# Patient Record
Sex: FEMALE | Race: WHITE | NOT HISPANIC OR LATINO | Employment: FULL TIME | URBAN - METROPOLITAN AREA
[De-identification: names, ages, dates, MRNs, and addresses within clinical notes are randomized per-mention and may not be internally consistent; named-entity substitution may affect disease eponyms.]

---

## 2017-02-28 ENCOUNTER — OFFICE VISIT (OUTPATIENT)
Dept: URGENT CARE | Facility: PHYSICIAN GROUP | Age: 22
End: 2017-02-28
Payer: COMMERCIAL

## 2017-02-28 VITALS
TEMPERATURE: 98.4 F | HEART RATE: 95 BPM | DIASTOLIC BLOOD PRESSURE: 56 MMHG | WEIGHT: 110 LBS | HEIGHT: 60 IN | RESPIRATION RATE: 20 BRPM | OXYGEN SATURATION: 97 % | BODY MASS INDEX: 21.6 KG/M2 | SYSTOLIC BLOOD PRESSURE: 110 MMHG

## 2017-02-28 DIAGNOSIS — J06.9 ACUTE URI: ICD-10-CM

## 2017-02-28 LAB
FLUAV+FLUBV AG SPEC QL IA: NEGATIVE
INT CON NEG: NEGATIVE
INT CON NEG: NEGATIVE
INT CON POS: POSITIVE
INT CON POS: POSITIVE
S PYO AG THROAT QL: NEGATIVE

## 2017-02-28 PROCEDURE — 87880 STREP A ASSAY W/OPTIC: CPT | Performed by: PHYSICIAN ASSISTANT

## 2017-02-28 PROCEDURE — 99214 OFFICE O/P EST MOD 30 MIN: CPT | Performed by: PHYSICIAN ASSISTANT

## 2017-02-28 PROCEDURE — 87804 INFLUENZA ASSAY W/OPTIC: CPT | Performed by: PHYSICIAN ASSISTANT

## 2017-02-28 RX ORDER — CODEINE PHOSPHATE AND GUAIFENESIN 10; 100 MG/5ML; MG/5ML
SOLUTION ORAL
Qty: 100 ML | Refills: 0 | Status: SHIPPED | OUTPATIENT
Start: 2017-02-28 | End: 2017-04-18

## 2017-02-28 RX ORDER — FLUTICASONE PROPIONATE 50 MCG
1 SPRAY, SUSPENSION (ML) NASAL 2 TIMES DAILY
Qty: 16 G | Refills: 0 | Status: SHIPPED | OUTPATIENT
Start: 2017-02-28 | End: 2017-04-18

## 2017-02-28 NOTE — MR AVS SNAPSHOT
Merissa Le Hyde   2017 6:00 PM   Office Visit   MRN: [id number]    Department:  [facility]   Dept Phone:  [phone]    Description:  Female : 1995   Provider:  Jessica Hunter PA-C           Reason for Visit     Sinus Problem Severe sinus congestion, cough, sore throat, headache x 4 days       Allergies as of 2017     No Known Allergies      You were diagnosed with     Acute URI   [[id number]]         Vital Signs     Blood Pressure Pulse Temperature Respirations Height Weight    110/56 mmHg 95 37.9 °C (100.2 °F) 20 1.524 m (5') 49.896 kg (110 lb)    Body Mass Index Oxygen Saturation Smoking Status             21.48 kg/m2 97% Current Some Day Smoker         Basic Information     Date Of Birth Sex Race Ethnicity Preferred Language    1995 Female White Non- English      Health Maintenance        Date Due Completion Dates    IMM HEP B VACCINE (1 of 3 - Primary Series) 1997 ---    IMM HEP A VACCINE (1 of 2 - Standard Series) 1998 ---    IMM HPV VACCINE (1 of 3 - Female 3 Dose Series) 2008 ---    IMM VARICELLA (CHICKENPOX) VACCINE (1 of 2 - 2 Dose Adolescent Series) 2010 ---    IMM MENINGOCOCCAL VACCINE (MCV4) (1 of 1) 2013 ---    IMM INFLUENZA (1) 2016 ---    IMM DTaP/Tdap/Td Vaccine (1 - Tdap) 2016 ---            Results     POCT Rapid Strep A                   Current Immunizations     No immunizations on file.      Below and/or attached are the medications your provider expects you to take. Review all of your home medications and newly ordered medications with your provider and/or pharmacist. Follow medication instructions as directed by your provider and/or pharmacist. Please keep your medication list with you and share with your provider. Update the information when medications are discontinued, doses are changed, or new medications (including over-the-counter products) are added; and carry medication information at all times in  the event of emergency situations     Allergies:  No Known Allergies          Medications  Valid as of: February 28, 2017 -  6:52 PM    Generic Name Brand Name Tablet Size Instructions for use    Amoxicillin (Tab) AMOXIL 875 MG Take 1 Tab by mouth 2 times a day.        Fluticasone Propionate (Suspension) FLONASE 50 MCG/ACT Spray 1 Spray in nose 2 times a day.        Guaifenesin-Codeine (Solution) ROBITUSSIN -10 mg/5mL 1-2 teaspoons at bedtime prn cough.  No driving.        Ibuprofen   Take  by mouth.        Pseudoeph-Doxylamine-DM-APAP   Take  by mouth.        .                 Medicines prescribed today were sent to:     [facility] #[id number] - [place], [place] - [address]    [address] [facility] [place] [address]    Phone: [phone] Fax: [phone]    Open 24 Hours?: No      Medication refill instructions:       If your prescription bottle indicates you have medication refills left, it is not necessary to call your provider’s office. Please contact your pharmacy and they will refill your medication.    If your prescription bottle indicates you do not have any refills left, you may request refills at any time through one of the following ways: The online [facility] system (except Urgent Care), by calling your provider’s office, or by asking your pharmacy to contact your provider’s office with a refill request. Medication refills are processed only during regular business hours and may not be available until the next business day. Your provider may request additional information or to have a follow-up visit with you prior to refilling your medication.   *Please Note: Medication refills are assigned a new Rx number when refilled electronically. Your pharmacy may indicate that no refills were authorized even though a new prescription for the same medication is available at the pharmacy. Please request the medicine by name with the pharmacy before contacting your provider for a refill.            [employer] Access Code: [email or web address]  Expires: 3/20/2017  9:59 AM    Your email address is not on file at [facility].  Email Addresses are required for you to sign up for [employer], please contact [phone] to verify your personal information and to provide your email address prior to attempting to register for [employer].    Merissa Hyde  [id number]  [place] [address]    Dezine[employer]  A secure, online tool to manage your health information     [employer]’s Dez[employer]® is a secure, online tool that connects you to your personalized health information from the privacy of your home -- day or night - making it very easy for you to manage your healthcare. Once the activation process is completed, you can even access your medical information using the Dezineforce marcela, which is available for free in the Apple Marcela store or Google Play store.     To learn more about [employer], visit [email or web address]    There are two levels of access available (as shown below):   My Chart Features  [facility] Primary Care Doctor [facility]  Specialists [facility]  Urgent  Care Non-[facility] Primary Care Doctor   Email your healthcare team securely and privately 24/7 X X X    Manage appointments: schedule your next appointment; view details of past/upcoming appointments X      Request prescription refills. X      View recent personal medical records, including lab and immunizations X X X X   View health record, including health history, allergies, medications X X X X   Read reports about your outpatient visits, procedures, consult and ER notes X X X X   See your discharge summary, which is a recap of your hospital and/or ER visit that includes your diagnosis, lab results, and care plan X X  X     How to register for [employer]:  Once your e-mail address has been verified, follow the following steps to sign up for [employer].     1. Go to  [email or web address]  2. Click on the Sign Up Now box, which takes you to the New Member  Sign Up page. You will need to provide the following information:  a. Enter your [facility] Access Code exactly as it appears at the top of this page. (You will not need to use this code after you’ve completed the sign-up process. If you do not sign up before the expiration date, you must request a new code.)   b. Enter your date of birth.   c. Enter your home email address.   d. Click Submit, and follow the next screen’s instructions.  3. Create a [facility] [employer] ID. This will be your [facility] [employer] login ID and cannot be changed, so think of one that is secure and easy to remember.  4. Create a [facility] password. You can change your password at any time.  5. Enter your Password Reset Question and Answer. This can be used at a later time if you forget your password.   6. Enter your e-mail address. This allows you to receive e-mail notifications when new information is available in [facility] [facility].  7. Click Sign Up. You can now view your health information.    For assistance activating your [facility] [employer] account, call [phone]

## 2017-02-28 NOTE — Clinical Note
February 28, 2017         Patient: Merissa Hyde   YOB: 1995   Date of Visit: 2/28/2017           To Whom it May Concern:    Merissa Hyde was seen in my clinic on 2/28/2017. She is to be off tomorrow and Thursday to recover.       If you have any questions or concerns, please don't hesitate to call.        Sincerely,           Jessica Hunter PA-C  Electronically Signed

## 2017-03-01 NOTE — PROGRESS NOTES
Chief Complaint   Patient presents with   • Sinus Problem     Severe sinus congestion, cough, sore throat, headache x 4 days        HISTORY OF PRESENT ILLNESS: Patient is a 21 y.o. female who presents today for 4 days of feeling unwell.   Overall she is about the same, possibly slightly better today.   Her headache has improved.    She states she started with headaches that felt like migraines that worsened into nasal congestion, sore throat/scratchy voice.   She has been coughing as well that has been keeping her up.  Occsaionally cigarette smoker but does use e-cigarette daily.   Has felt hot and cold, tactile fevers.   She has taken NyQuil but has not helped her sleep.  Took Advil 400 mg BID with minimal relief of headache.     There are no active problems to display for this patient.      Allergies:Review of patient's allergies indicates no known allergies.    Current Outpatient Prescriptions Ordered in [facility]   Medication Sig Dispense Refill   • Pseudoeph-Doxylamine-DM-APAP (NYQUIL PO) Take  by mouth.     • Ibuprofen (ADVIL MIGRAINE PO) Take  by mouth.     • amoxicillin (AMOXIL) 875 MG tablet Take 1 Tab by mouth 2 times a day. 20 Tab 0     No current Epic-ordered facility-administered medications on file.       No past medical history on file.    Social History   Substance Use Topics   • Smoking status: Current Some Day Smoker   • Smokeless tobacco: Not on file   • Alcohol Use: Not on file       No family status information on file.   No family history on file.No pertinent FH    ROS:  Review of Systems   Constitutional: SEE HPI  HENT: SEE HPI  Eyes: Negative for blurred vision.   Respiratory: SEE HPI  Cardiovascular: Negative for chest pain, palpitations, orthopnea and leg swelling.   Gastrointestinal: Negative for heartburn, nausea, vomiting and abdominal pain.   All other systems reviewed and are negative.       Exam:  Blood pressure 110/56, pulse 95, temperature 36.9 °C (98.4 °F), resp. rate 20, height 1.524 m  (5'), weight 49.896 kg (110 lb), SpO2 97 %.  General:  Well nourished, well developed female in NAD  Eyes: PERRLA, EOM within normal limits, no conjunctival injection, no scleral icterus, visual fields and acuity grossly intact.  Ears: Normal shape and symmetry, no tenderness, no discharge. External canals are without any significant edema or erythema. Tympanic membranes are without any inflammation, no effusion. Gross auditory acuity is intact  Nose: Symmetrical, sinuses without tenderness, clear rhinorrhea, apparent nasal congestion, erythematous nostrils from wiping.    Mouth: reasonable hygiene, mild diffuse erythema without exudates or tonsillar enlargement.  Neck: no masses, range of motion within normal limits, no tracheal deviation. No lymphadenopathy  Pulmonary: Normal respiratory effort, no wheezes, crackles, or rhonchi.  Cardiovascular: regular rate and rhythm without murmurs, rubs, or gallops.  Skin: No visible rashes or lesion. Warm, pink, dry.   Extremities: no clubbing, cyanosis, or edema.  Neuro: A&O x 3. Speech normal/clear.  Normal gait.       Assessment/Plan:  1. Acute URI  POCT Rapid Strep A    fluticasone (FLONASE ALLERGY RELIEF) 50 MCG/ACT nasal spray    guaifenesin-codeine (CHERATUSSIN AC) Solution oral solution    POCT Influenza A/B       -neg strep/flu.  Oral temp 98.4.  No anti-pyretics today.   -discussed that I felt this was viral in nature. Did not see any evidence of a bacterial process. Discussed natural progression and sx care.  -fluids, rest emphasized.  Flonase/Allegra or similar for post nasal drainage trial.   -humidifier/steam inhalations.    -codeine cough syrup as above.  Emphasized drowsy and no driving on this medicine.  Patient expressed understanding of this.   -work note provided. RTC precautions.     Supportive care, differential diagnoses, and indications for immediate follow-up discussed with patient.   Pathogenesis of diagnosis discussed including typical length and  natural progression.   Instructed to return to clinic or nearest emergency department for any change in condition, further concerns, or worsening of symptoms.  Patient states understanding of the plan of care and discharge instructions.      Jessica Hunter PA-C

## 2017-04-18 ENCOUNTER — HOSPITAL ENCOUNTER (OUTPATIENT)
Dept: LAB | Facility: MEDICAL CENTER | Age: 22
End: 2017-04-18
Attending: FAMILY MEDICINE
Payer: COMMERCIAL

## 2017-04-18 ENCOUNTER — OFFICE VISIT (OUTPATIENT)
Dept: MEDICAL GROUP | Facility: LAB | Age: 22
End: 2017-04-18
Payer: COMMERCIAL

## 2017-04-18 ENCOUNTER — HOSPITAL ENCOUNTER (OUTPATIENT)
Dept: RADIOLOGY | Facility: MEDICAL CENTER | Age: 22
End: 2017-04-18
Attending: FAMILY MEDICINE
Payer: COMMERCIAL

## 2017-04-18 VITALS
HEIGHT: 61 IN | RESPIRATION RATE: 12 BRPM | HEART RATE: 110 BPM | BODY MASS INDEX: 25.86 KG/M2 | SYSTOLIC BLOOD PRESSURE: 120 MMHG | DIASTOLIC BLOOD PRESSURE: 60 MMHG | TEMPERATURE: 100 F | WEIGHT: 137 LBS | OXYGEN SATURATION: 97 %

## 2017-04-18 DIAGNOSIS — Z34.90 PREGNANCY, UNSPECIFIED GESTATIONAL AGE: ICD-10-CM

## 2017-04-18 DIAGNOSIS — G43.809 OTHER MIGRAINE WITHOUT STATUS MIGRAINOSUS, NOT INTRACTABLE: ICD-10-CM

## 2017-04-18 DIAGNOSIS — E55.9 VITAMIN D INSUFFICIENCY: ICD-10-CM

## 2017-04-18 DIAGNOSIS — Z20.2 POSSIBLE EXPOSURE TO STD: ICD-10-CM

## 2017-04-18 PROBLEM — G43.909 MIGRAINE WITHOUT STATUS MIGRAINOSUS, NOT INTRACTABLE: Status: ACTIVE | Noted: 2017-04-18

## 2017-04-18 LAB
25(OH)D3 SERPL-MCNC: 5 NG/ML (ref 30–100)
ALBUMIN SERPL BCP-MCNC: 3.5 G/DL (ref 3.2–4.9)
ALBUMIN/GLOB SERPL: 1 G/DL
ALP SERPL-CCNC: 95 U/L (ref 30–99)
ALT SERPL-CCNC: 9 U/L (ref 2–50)
ANION GAP SERPL CALC-SCNC: 6 MMOL/L (ref 0–11.9)
AST SERPL-CCNC: 12 U/L (ref 12–45)
BASOPHILS # BLD AUTO: 0.2 % (ref 0–1.8)
BASOPHILS # BLD: 0.01 K/UL (ref 0–0.12)
BILIRUB SERPL-MCNC: 0.3 MG/DL (ref 0.1–1.5)
BUN SERPL-MCNC: 8 MG/DL (ref 8–22)
CALCIUM SERPL-MCNC: 9.3 MG/DL (ref 8.5–10.5)
CHLORIDE SERPL-SCNC: 102 MMOL/L (ref 96–112)
CO2 SERPL-SCNC: 23 MMOL/L (ref 20–33)
CREAT SERPL-MCNC: 0.47 MG/DL (ref 0.5–1.4)
EOSINOPHIL # BLD AUTO: 0.01 K/UL (ref 0–0.51)
EOSINOPHIL NFR BLD: 0.2 % (ref 0–6.9)
ERYTHROCYTE [DISTWIDTH] IN BLOOD BY AUTOMATED COUNT: 43.8 FL (ref 35.9–50)
GFR SERPL CREATININE-BSD FRML MDRD: >60 ML/MIN/1.73 M 2
GLOBULIN SER CALC-MCNC: 3.5 G/DL (ref 1.9–3.5)
GLUCOSE SERPL-MCNC: 99 MG/DL (ref 65–99)
HCT VFR BLD AUTO: 31.1 % (ref 37–47)
HCV AB SER QL: NEGATIVE
HGB BLD-MCNC: 10.5 G/DL (ref 12–16)
HIV 1+2 AB+HIV1 P24 AG SERPL QL IA: NON REACTIVE
IMM GRANULOCYTES # BLD AUTO: 0.02 K/UL (ref 0–0.11)
IMM GRANULOCYTES NFR BLD AUTO: 0.3 % (ref 0–0.9)
LYMPHOCYTES # BLD AUTO: 1.56 K/UL (ref 1–4.8)
LYMPHOCYTES NFR BLD: 25.3 % (ref 22–41)
MCH RBC QN AUTO: 33 PG (ref 27–33)
MCHC RBC AUTO-ENTMCNC: 33.8 G/DL (ref 33.6–35)
MCV RBC AUTO: 97.8 FL (ref 81.4–97.8)
MONOCYTES # BLD AUTO: 0.4 K/UL (ref 0–0.85)
MONOCYTES NFR BLD AUTO: 6.5 % (ref 0–13.4)
NEUTROPHILS # BLD AUTO: 4.17 K/UL (ref 2–7.15)
NEUTROPHILS NFR BLD: 67.5 % (ref 44–72)
NRBC # BLD AUTO: 0 K/UL
NRBC BLD AUTO-RTO: 0 /100 WBC
PLATELET # BLD AUTO: 194 K/UL (ref 164–446)
PMV BLD AUTO: 12.6 FL (ref 9–12.9)
POTASSIUM SERPL-SCNC: 3.8 MMOL/L (ref 3.6–5.5)
PROT SERPL-MCNC: 7 G/DL (ref 6–8.2)
RBC # BLD AUTO: 3.18 M/UL (ref 4.2–5.4)
SODIUM SERPL-SCNC: 131 MMOL/L (ref 135–145)
TREPONEMA PALLIDUM IGG+IGM AB [PRESENCE] IN SERUM OR PLASMA BY IMMUNOASSAY: NON REACTIVE
TSH SERPL DL<=0.005 MIU/L-ACNC: 1.06 UIU/ML (ref 0.3–3.7)
WBC # BLD AUTO: 6.2 K/UL (ref 4.8–10.8)

## 2017-04-18 PROCEDURE — 99203 OFFICE O/P NEW LOW 30 MIN: CPT | Performed by: FAMILY MEDICINE

## 2017-04-18 PROCEDURE — 84443 ASSAY THYROID STIM HORMONE: CPT

## 2017-04-18 PROCEDURE — 80053 COMPREHEN METABOLIC PANEL: CPT

## 2017-04-18 PROCEDURE — 86695 HERPES SIMPLEX TYPE 1 TEST: CPT

## 2017-04-18 PROCEDURE — 86780 TREPONEMA PALLIDUM: CPT

## 2017-04-18 PROCEDURE — 87389 HIV-1 AG W/HIV-1&-2 AB AG IA: CPT

## 2017-04-18 PROCEDURE — 36415 COLL VENOUS BLD VENIPUNCTURE: CPT

## 2017-04-18 PROCEDURE — 85025 COMPLETE CBC W/AUTO DIFF WBC: CPT

## 2017-04-18 PROCEDURE — 86696 HERPES SIMPLEX TYPE 2 TEST: CPT

## 2017-04-18 PROCEDURE — 87491 CHLMYD TRACH DNA AMP PROBE: CPT

## 2017-04-18 PROCEDURE — 87591 N.GONORRHOEAE DNA AMP PROB: CPT

## 2017-04-18 PROCEDURE — 86803 HEPATITIS C AB TEST: CPT

## 2017-04-18 PROCEDURE — 82306 VITAMIN D 25 HYDROXY: CPT

## 2017-04-18 PROCEDURE — 76805 OB US >/= 14 WKS SNGL FETUS: CPT

## 2017-04-18 ASSESSMENT — ENCOUNTER SYMPTOMS
HEMOPTYSIS: 0
DIARRHEA: 0
BRUISES/BLEEDS EASILY: 0
NECK PAIN: 0
BLOOD IN STOOL: 0
COUGH: 0
LOSS OF CONSCIOUSNESS: 1
WHEEZING: 1
DOUBLE VISION: 0
HEARTBURN: 0
HEADACHES: 1
DIZZINESS: 1

## 2017-04-18 ASSESSMENT — PATIENT HEALTH QUESTIONNAIRE - PHQ9: CLINICAL INTERPRETATION OF PHQ2 SCORE: 0
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
## 2017-04-18 NOTE — MR AVS SNAPSHOT
"OctavianoLucinda Le Barrett   2017 3:00 PM   Office Visit   MRN: [id number]    Department:  [facility]   Dept Phone:  [phone]    Description:  Female : 1995   Provider:  Bethany Rose M.D.           Reason for Visit     Establish Care     Medication Refill flonase, generic zyrtec,    Headache would like a refill for this, but does not remember the name of it    Referral Needed OBGYN/ 7 months pregnant      Allergies as of 2017     No Known Allergies      You were diagnosed with     Pregnancy, unspecified gestational age   [3361423]       Possible exposure to STD   [641493]       Vitamin D insufficiency   [421474]         Vital Signs     Blood Pressure Pulse Temperature Respirations Height Weight    120/60 mmHg 110 37.8 °C (100 °F) 12 1.549 m (5' 0.98\") 62.143 kg (137 lb)    Body Mass Index Oxygen Saturation Smoking Status             25.90 kg/m2 97% Current Some Day Smoker         Basic Information     Date Of Birth Sex Race Ethnicity Preferred Language    1995 Female White Non- English      Your appointments     2017  6:00 PM   US PREG 60 with VISTA US 2   IMAGING VISTA ([place])    [address] [facility] [address] [phone]           For [facility] patients only: 1. Please arrive 15 min prior to your appointment time. 2. If you're late, you will be rescheduled for the next available appointment. 3. If you need to reschedule your appointment, please call us at [phone] 48 hours prior to your appointment. 4. Do not bring children as they will not be allowed in exam room. 5. Only one family member may be present in room during exam. 6. The exam will be 30-60 minutes depending on the exam ordered by the physician. 7. The sonographer is not allowed to discuss findings during the exam. Your provider will go over the results with you at your next appointment. 8. The purpose of this ultrasound is to determine if baby is healthy. Diagnostic " ultrasounds are NOT to determine the gender of the baby. 9. NO photography or video recording is allowed in exam room. 10. NO cell phones allowed in the exam room. INFORMACION SOBRE MEAD ULTRASONIDO 1. Por favor de llegar 15 minutos antes de mead linda. 2. Si llega tarde, le tenemos que cambiar la linda para otra fecha. 3. Si necesita cambiar mead linda, por favor llame 48 horas antes de la linda. [phone] 4. Por favor no traer niños. No se permiten en cuarto de Ultrasonido. 5. Solamente se permite amalia persona en el cuarto john el examen. 6. El examen dura 30-60 minutos, dependiendo del examen ordenado por el Doctor. 7. El Sonógrafo no está autorizado hablar sobre mead examen. Mead doctor o partera le va explicar los resultados en mead próxima linda. 8. El propósito del Ultrasonido es para determinar si mead sudheer viene saludable. No es para determinar el sexo de mead sudheer. 9. Por favor no fotos o cámaras de grabar. 10. No celulares permitidos en el cuarto de examen.              Health Maintenance        Date Due Completion Dates    IMM HEP B VACCINE (1 of 3 - Primary Series) 1995 ---    IMM HEP A VACCINE (1 of 2 - Standard Series) 9/18/1998 ---    IMM HPV VACCINE (1 of 3 - Female 3 Dose Series) 9/18/2008 ---    IMM VARICELLA (CHICKENPOX) VACCINE (1 of 2 - 2 Dose Adolescent Series) 9/18/2010 ---    IMM MENINGOCOCCAL VACCINE (MCV4) (1 of 1) 9/18/2013 ---    IMM DTaP/Tdap/Td Vaccine (1 - Tdap) 9/18/2016 ---            Current Immunizations     No immunizations on file.      Below and/or attached are the medications your provider expects you to take. Review all of your home medications and newly ordered medications with your provider and/or pharmacist. Follow medication instructions as directed by your provider and/or pharmacist. Please keep your medication list with you and share with your provider. Update the information when medications are discontinued, doses are changed, or new medications (including over-the-counter  products) are added; and carry medication information at all times in the event of emergency situations     Allergies:  No Known Allergies          Medications  Valid as of: April 18, 2017 -  3:34 PM    Generic Name Brand Name Tablet Size Instructions for use    .                 Medicines prescribed today were sent to:     [facility]/PHARMACY #[id number] - [place], [place] - [address]    [address] [place] [address]    Phone: [phone] Fax: [phone]    Open 24 Hours?: No      Medication refill instructions:       If your prescription bottle indicates you have medication refills left, it is not necessary to call your provider’s office. Please contact your pharmacy and they will refill your medication.    If your prescription bottle indicates you do not have any refills left, you may request refills at any time through one of the following ways: The online FlowJob system (except Urgent Care), by calling your provider’s office, or by asking your pharmacy to contact your provider’s office with a refill request. Medication refills are processed only during regular business hours and may not be available until the next business day. Your provider may request additional information or to have a follow-up visit with you prior to refilling your medication.   *Please Note: Medication refills are assigned a new Rx number when refilled electronically. Your pharmacy may indicate that no refills were authorized even though a new prescription for the same medication is available at the pharmacy. Please request the medicine by name with the pharmacy before contacting your provider for a refill.        Your To Do List     Future Labs/Procedures Complete By Expires    CBC WITH DIFFERENTIAL  As directed 4/18/2018    CHLAMYDIA/GC PCR URINE OR SWAB  As directed 4/18/2018    COMP METABOLIC PANEL  As directed 4/18/2018    HEP C VIRUS ANTIBODY  As directed 4/18/2018    HIV ANTIBODIES  As directed 4/18/2018    HSV I  SPECIFIC IGG AB  As directed 4/18/2018    HSV II SPECIFIC IGG AB  As directed 4/18/2018    T.PALLIDUM AB EIA  As directed 4/18/2018    TSH  As directed 4/18/2018    US-OB 2ND 3RD TRI COMPLETE  As directed 4/18/2018    VITAMIN D,25 HYDROXY  As directed 4/18/2018         MyChart Access Code: Activation code not generated  Current MyChart Status: Active          Quit Tobacco Information     Do you want to quit using tobacco?    Quitting tobacco decreases risks of cancer, heart and lung disease, increases life expectancy, improves sense of taste and smell, and increases spending money, among other benefits.    If you are thinking about quitting, we can help.  • [facility] Quit Tobacco Program: [phone]  o Program occurs weekly for four weeks and includes pharmacist consultation on products to support quitting smoking or chewing tobacco. A provider referral is needed for pharmacist consultation.  • Tobacco Users Help Hotline: 4-768-QUIT-NOW (895-0729) or https://[place].quitlogix.org/  o Free, confidential telephone and online coaching for [place] residents. Sessions are designed on a schedule that is convenient for you. Eligible clients receive free nicotine replacement therapy.  • Nationally: www.smokefree.gov  o Information and professional assistance to support both immediate and long-term needs as you become, and remain, a non-smoker. Smokefree.gov allows you to choose the help that best fits your needs.

## 2017-04-19 LAB
C TRACH DNA SPEC QL NAA+PROBE: NEGATIVE
N GONORRHOEA DNA SPEC QL NAA+PROBE: NEGATIVE
SPECIMEN SOURCE: NORMAL

## 2017-04-20 ENCOUNTER — TELEPHONE (OUTPATIENT)
Dept: MEDICAL GROUP | Facility: LAB | Age: 22
End: 2017-04-20

## 2017-04-20 LAB
HSV1 GG IGG SER-ACNC: <0.01 IV
HSV2 GG IGG SER-ACNC: 0.05 IV

## 2017-04-20 NOTE — TELEPHONE ENCOUNTER
----- Message from Bethany Rose M.D. sent at 4/20/2017  7:51 AM PDT -----  Ultrasound shows that your baby is a little bit smaller than expected. They also cannot get a good look at the face which is not something to worry about. Please keep your OB appointment for Monday.

## 2017-04-20 NOTE — TELEPHONE ENCOUNTER
----- Message from Bethany Rose M.D. sent at 4/20/2017  7:50 AM PDT -----  Labs show that you are a little anemic and your vitamin D is low. Chlamydia, gonorrhea, hepatitis C, HIV, syphilis all negative. Please start a prenatal vitamin

## 2017-04-26 NOTE — TELEPHONE ENCOUNTER
Trading Block Released Result Comments      Entered by Bethany Rose M.D. at 4/20/2017  7:50 AM     Read by Merissa Hyde at 4/20/2017  1:23 PM     Labs show that you are a little anemic and your vitamin D is low. Chlamydia, gonorrhea, hepatitis C, HIV, syphilis all negative. Please start a prenatal vitamin

## 2017-05-04 ENCOUNTER — OFFICE VISIT (OUTPATIENT)
Dept: MEDICAL GROUP | Facility: LAB | Age: 22
End: 2017-05-04
Payer: COMMERCIAL

## 2017-05-04 ENCOUNTER — HOSPITAL ENCOUNTER (OUTPATIENT)
Facility: MEDICAL CENTER | Age: 22
End: 2017-05-04
Attending: FAMILY MEDICINE
Payer: COMMERCIAL

## 2017-05-04 VITALS
TEMPERATURE: 97.6 F | OXYGEN SATURATION: 97 % | HEART RATE: 84 BPM | WEIGHT: 136 LBS | DIASTOLIC BLOOD PRESSURE: 64 MMHG | HEIGHT: 61 IN | RESPIRATION RATE: 16 BRPM | BODY MASS INDEX: 25.68 KG/M2 | SYSTOLIC BLOOD PRESSURE: 120 MMHG

## 2017-05-04 DIAGNOSIS — R82.90 ABNORMAL URINE FINDINGS: ICD-10-CM

## 2017-05-04 DIAGNOSIS — O09.33 NO PRENATAL CARE IN CURRENT PREGNANCY, THIRD TRIMESTER: ICD-10-CM

## 2017-05-04 DIAGNOSIS — E55.9 VITAMIN D DEFICIENCY DISEASE: ICD-10-CM

## 2017-05-04 DIAGNOSIS — D64.9 ANEMIA, UNSPECIFIED TYPE: ICD-10-CM

## 2017-05-04 DIAGNOSIS — Z3A.36 36 WEEKS GESTATION OF PREGNANCY: ICD-10-CM

## 2017-05-04 PROBLEM — O09.30 NO PRENATAL CARE IN CURRENT PREGNANCY: Status: ACTIVE | Noted: 2017-05-04

## 2017-05-04 LAB
APPEARANCE UR: NORMAL
BILIRUB UR STRIP-MCNC: NORMAL MG/DL
COLOR UR AUTO: YELLOW
GLUCOSE UR STRIP.AUTO-MCNC: NORMAL MG/DL
KETONES UR STRIP.AUTO-MCNC: NORMAL MG/DL
LEUKOCYTE ESTERASE UR QL STRIP.AUTO: NORMAL
NITRITE UR QL STRIP.AUTO: NORMAL
PH UR STRIP.AUTO: 7.5 [PH] (ref 5–8)
PROT UR QL STRIP: NORMAL MG/DL
RBC UR QL AUTO: NORMAL
SP GR UR STRIP.AUTO: 1.01
UROBILINOGEN UR STRIP-MCNC: 0.2 MG/DL

## 2017-05-04 PROCEDURE — 87086 URINE CULTURE/COLONY COUNT: CPT

## 2017-05-04 PROCEDURE — 99214 OFFICE O/P EST MOD 30 MIN: CPT | Performed by: FAMILY MEDICINE

## 2017-05-04 PROCEDURE — 81002 URINALYSIS NONAUTO W/O SCOPE: CPT | Performed by: FAMILY MEDICINE

## 2017-05-04 NOTE — PROGRESS NOTES
"Chief Complaint   Patient presents with   • Follow-Up       HISTORY OF PRESENT ILLNESS: Patient is a 21 y.o. female established patient who presents today to follow up on labs     Patient is here for follow-up. She has yet to see an obstetrician. She states that she has not been able to schedule an appointment due to her work schedule and also the next available appointment she was told with the end of June. She is here to follow-up on her labs. She is trying to take a prenatal vitamin however it's making her feel nauseated. Her LMP was 8/26/2016 with an EDC of 6/2/2017. Today she is 35 weeks and 6/7 weeks pregnant. States that she is having some irregular contractions which sound like Ian Abernathy contractions. She is trying to stay hydrated. She is still planning to get the baby up for adoption and she is working with an agency she reports. She is currently living with her aunt and uncle and she states that she is in a safe environment. She is working at a bank.    Patient did have a third trimester ultrasound in the gender of the fetus is likely female. Ultrasound suggests that gestational age is less than expected for dates.     Patient Active Problem List    Diagnosis Date Noted   • Pregnancy 04/18/2017   • Migraine without status migrainosus, not intractable 04/18/2017        Allergies:Review of patient's allergies indicates no known allergies.    No current outpatient prescriptions on file.     No current facility-administered medications for this visit.       Social History   Substance Use Topics   • Smoking status: Former Smoker   • Smokeless tobacco: Not on file   • Alcohol Use: No       Social History     Social History Narrative       No family history on file.    ROS:    Exam:    Blood pressure 120/64, pulse 84, temperature 36.4 °C (97.6 °F), resp. rate 16, height 1.549 m (5' 0.98\"), weight 61.689 kg (136 lb), last menstrual period 08/24/2016, SpO2 97 %.      Physical Exam   Constitutional:  Appears " well-developed and well-nourished. Is active and cooperative.  Non-toxic appearance.   Head: Normocephalic and atraumatic.   Right Ear: External ear normal. Left Ear: External ear normal.   Eyes: Conjunctivae, EOM and lids are normal. No scleral icterus.   Abdominal: gravid. Baby appears small for gestational age with Leopold maneuvers.    Neurological: Alert  No tremor. No display of seizure activity. Coordination and gait normal.   Skin: Skin is warm and dry. Patient is not diaphoretic.   Psychiatric: patient has a normal mood and affect; speech is normal and behavior is normal.    Trace LE           4/18/2017 6:11 PM    HISTORY/REASON FOR EXAM:  Evaluate fetal anatomy, late care    TECHNIQUE/EXAM DESCRIPTION: OB complete ultrasound.    COMPARISON:  None    FINDINGS:  Fetal Lie:  Vertex  LMP:  8/26/2016  Clinical AJNIE by LMP:  6/2/2017    Placenta (Location):  Anterior  Placenta Previa: No  Placental Grade: II    Amniotic Fluid Volume:  LONG = 16.5 cm    Fetal Heart Rate:  160 bpm    Cervical Length:  3.08 cm transabdominal    No maternal adnexal mass is identified.    Umbilical Artery S/D Ratio(s):  Not applicable    Fetal Anatomy  (Seen or Not Seen)  Lateral Ventricles     Seen  Cisterna Magna        Seen  Cerebellum              Seen  CSP             Seen  Orbits             Seen  Face/Lips                NOT seen  Cord Insertion         Seen  Placental CI         Seen  4 Chamber Heart     Seen  LVOT               Seen  RVOT              Seen  Stomach       Seen  Kidneys                   Seen  Urinary Bladder      Seen  Spine                       Seen  3 Vessel Cord          Seen  Both Upper Extremities    Seen  Both Lower Extremities    Seen  Diaphragm             Seen  Movement       Seen  Gender:  Likely female    Fetal Biometry  BPD    7.83 cm, 31w 3d  HC    29.65 cm, 32w 6d  AC    26.40 cm, 30w 4d  Femur Length    5.99 cm, 31w 1d  Humerus Length    5.20 cm, 30w 2d  Cerebellum Diameter   3.56 cm    EGA by  this US:  31w 2d  JANIE by this US: 6/18/2017  JANIE by 1st US:  Not applicable    Estimated Fetal Weight:  1685 g    Comments:  Face and lips are not well-visualized.         Impression        Single intrauterine pregnancy of an estimated gestational age of 31w 2d with an estimated date of delivery of 6/18/2017.  Size less than expected for dates.    No fetal anomaly demonstrated.  Limited evaluation of the fetal face/lips.         Hospital Outpatient Visit on 04/18/2017   Component Date Value Ref Range Status   • Hepatitis C Antibody 04/18/2017 Negative  Negative Final    Comment: The [employer] HCV assay is limited to the detection of IgG  antibodies to hepatitis C virus in human serum.  The results  from this or any other diagnostic kit should be used and interpreted  only in the context of the overall clinical picture.  A negative  test result does not exclude the possibility of exposure to  hepatitis C virus.     • HIV Ag/Ab Combo Assay 04/18/2017 Non Reactive  Non Reactive Final    Comment: Screen is NEGATIVE for p24 antigen and HIV 1/O/2 antibodies.  A negative screen does not preclude the possibility of exposure  or infection.  Consider retesting in high-risk patients, if  clinically indicated.     • Syphilis, Treponemal Qual 04/18/2017 Non Reactive  Non Reactive Final    Comment: Result of Non-reactive indicates no serologic evidence of  infection with Treponema pallidum.  (Incubating or early  primary syphilis cannot be excluded).     • HSV1 Gly IgG 04/18/2017 <0.01  <=0.90 IV Final    Comment: INTERPRETIVE INFORMATION: HSV 1 Glycoprotein G Ab, IgG (JOE)  0.90 IV or less ........ Negative - No significant level  of detectable IgG antibody to  HSV type 1 glycoprotein G.  0.91 - 1.09 IV ......... Equivocal - Questionable  presence of IgG antibody to HSV  type 1 glycoprotein G. Repeat  testing in 10-14 days may be helpful.  1.10 IV or greater ..... Positive - IgG antibody to HSV  type 1 glycoprotein G  detected,  which may indicate a current or  past HSV infection.  Individuals infected with HSV may not exhibit detectable IgG  antibody to type specific HSV antigens 1 and 2 in the early stages  of infection. Detection of antibody presence in these cases may  only be possible using a non-type specific screening test.  Performed by [facility],  [address] [place] [address] [phone]  www.[email or web address], Matthew Camp MD - Lab. Director     • HSV2 Gly IgG 04/18/2017 0.05  <=0.90 IV Final    Comment: INTERPRETIVE INFORMATION: HSV 2 Glycoprotein G Ab, IgG (JOE)  0.90 IV or less ....... Negative - No significant level  of detectable IgG antibody to  HSV type 2 glycoprotein G.  0.91 - 1.09 IV ........ Equivocal - Questionable  presence of IgG antibody to HSV  type 2 glycoprotein G. Repeat  testing in 10-14 days may be helpful.  1.10 IV or greater .... Positive - IgG antibody to HSV  type 2 glycoprotein G detected,  which may indicate a current or  past HSV infection.  Individuals infected with HSV may not exhibit detectable IgG  antibody to type specific HSV antigens 1 and 2 in the early stages  of infection. Detection of antibody presence in these cases may  only be possible using a non-type specific screening test.  Performed by [facility],  [address] [place] [address] [phone]  www.[email or web address], Matthew aCmp MD - Lab. Director     • WBC 04/18/2017 6.2  4.8 - 10.8 K/uL Final   • RBC 04/18/2017 3.18* 4.20 - 5.40 M/uL Final   • Hemoglobin 04/18/2017 10.5* 12.0 - 16.0 g/dL Final   • Hematocrit 04/18/2017 31.1* 37.0 - 47.0 % Final   • MCV 04/18/2017 97.8  81.4 - 97.8 fL Final   • MCH 04/18/2017 33.0  27.0 - 33.0 pg Final   • MCHC 04/18/2017 33.8  33.6 - 35.0 g/dL Final   • RDW 04/18/2017 43.8  35.9 - 50.0 fL Final   • Platelet Count 04/18/2017 194  164 - 446 K/uL Final   • MPV 04/18/2017 12.6  9.0 - 12.9 fL Final   • Neutrophils-Polys 04/18/2017 67.50  44.00 - 72.00 % Final   • Lymphocytes 04/18/2017  25.30  22.00 - 41.00 % Final   • Monocytes 04/18/2017 6.50  0.00 - 13.40 % Final   • Eosinophils 04/18/2017 0.20  0.00 - 6.90 % Final   • Basophils 04/18/2017 0.20  0.00 - 1.80 % Final   • Immature Granulocytes 04/18/2017 0.30  0.00 - 0.90 % Final   • Nucleated RBC 04/18/2017 0.00   Final   • Neutrophils (Absolute) 04/18/2017 4.17  2.00 - 7.15 K/uL Final    Includes immature neutrophils, if present.   • Lymphs (Absolute) 04/18/2017 1.56  1.00 - 4.80 K/uL Final   • Monos (Absolute) 04/18/2017 0.40  0.00 - 0.85 K/uL Final   • Eos (Absolute) 04/18/2017 0.01  0.00 - 0.51 K/uL Final   • Baso (Absolute) 04/18/2017 0.01  0.00 - 0.12 K/uL Final   • Immature Granulocytes (abs) 04/18/2017 0.02  0.00 - 0.11 K/uL Final   • NRBC (Absolute) 04/18/2017 0.00   Final   • Sodium 04/18/2017 131* 135 - 145 mmol/L Final   • Potassium 04/18/2017 3.8  3.6 - 5.5 mmol/L Final   • Chloride 04/18/2017 102  96 - 112 mmol/L Final   • Co2 04/18/2017 23  20 - 33 mmol/L Final   • Anion Gap 04/18/2017 6.0  0.0 - 11.9 Final   • Glucose 04/18/2017 99  65 - 99 mg/dL Final   • Bun 04/18/2017 8  8 - 22 mg/dL Final   • Creatinine 04/18/2017 0.47* 0.50 - 1.40 mg/dL Final   • Calcium 04/18/2017 9.3  8.5 - 10.5 mg/dL Final   • AST(SGOT) 04/18/2017 12  12 - 45 U/L Final   • ALT(SGPT) 04/18/2017 9  2 - 50 U/L Final   • Alkaline Phosphatase 04/18/2017 95  30 - 99 U/L Final   • Total Bilirubin 04/18/2017 0.3  0.1 - 1.5 mg/dL Final   • Albumin 04/18/2017 3.5  3.2 - 4.9 g/dL Final   • Total Protein 04/18/2017 7.0  6.0 - 8.2 g/dL Final   • Globulin 04/18/2017 3.5  1.9 - 3.5 g/dL Final   • A-G Ratio 04/18/2017 1.0   Final   • TSH 04/18/2017 1.060  0.300 - 3.700 uIU/mL Final   • 25-Hydroxy   Vitamin D 25 04/18/2017 5* 30 - 100 ng/mL Final    Comment: Adult Ranges:   <20 ng/mL - Deficiency  20-29 ng/mL - Insufficiency   ng/mL - Sufficiency  The [employer] Vitamin D Assay is standardized to the  [facility] reference measurement procedures, a  reference  method for the Vitamin D Standardization Program  (VDSP).  The VDSP aligns patient results among 25 (OH)  Vitamin D methods.     • Source 04/18/2017 Urine   Final   • C. trachomatis by PCR 04/18/2017 Negative  Negative Final   • N. gonorrhoeae by PCR 04/18/2017 Negative  Negative Final   • GFR If  04/18/2017 >60  >60 mL/min/1.73 m 2 Final   • GFR If Non  04/18/2017 >60  >60 mL/min/1.73 m 2 Final         Assessment/Plan:    1. 36 weeks gestation of pregnancy  - POCT Urinalysis    2. No prenatal care in current pregnancy, third trimester    3. Vitamin D deficiency disease    4. Anemia, unspecified type    5. Abnormal urine findings  - URINE CULTURE(NEW); Future    25 minutes spent with the patient, over 50% of this time was spent face-to-face counseling and discussing treatment plan. Time was also spent trying to coordinate care with local obstetricians. Patient is encouraged to try to work on taking her prenatal vitamin. Also we discussed her nutrition. She is to start vitamin D if possible. Will contact her when we get some information as to when we can have her be seen for an appointment and also will plan on scheduling this around her work schedule    Please note that this dictation was created using voice recognition software. I have made every reasonable attempt to correct obvious errors, but I expect that there are errors of grammar and possibly content that I did not discover before finalizing the note.

## 2017-05-04 NOTE — MR AVS SNAPSHOT
"OctavianoLucinda Le Barrett   2017 4:00 PM   Office Visit   MRN: [id number]    Department:  [facility]   Dept Phone:  [phone]    Description:  Female : 1995   Provider:  Bethany Rose M.D.           Reason for Visit     Follow-Up           Allergies as of 2017     No Known Allergies      Vital Signs     Blood Pressure Pulse Temperature Respirations Height Weight    120/64 mmHg 84 36.4 °C (97.6 °F) 16 1.549 m (5' 0.98\") 61.689 kg (136 lb)    Body Mass Index Oxygen Saturation Last Menstrual Period Smoking Status          25.71 kg/m2 97% 2016 Former Smoker        Basic Information     Date Of Birth Sex Race Ethnicity Preferred Language    1995 Female White Non- English      Problem List              ICD-10-CM Priority Class Noted - Resolved    Pregnancy Z33.1   2017 - Present    Migraine without status migrainosus, not intractable G43.909   2017 - Present      Health Maintenance        Date Due Completion Dates    IMM HEP B VACCINE (1 of 3 - Primary Series) 1995 ---    IMM HEP A VACCINE (1 of 2 - Standard Series) 1998 ---    IMM HPV VACCINE (1 of 3 - Female 3 Dose Series) 2008 ---    IMM VARICELLA (CHICKENPOX) VACCINE (1 of 2 - 2 Dose Adolescent Series) 2010 ---    IMM MENINGOCOCCAL VACCINE (MCV4) (1 of 1) 2013 ---    IMM DTaP/Tdap/Td Vaccine (1 - Tdap) 2016 ---            Current Immunizations     No immunizations on file.      Below and/or attached are the medications your provider expects you to take. Review all of your home medications and newly ordered medications with your provider and/or pharmacist. Follow medication instructions as directed by your provider and/or pharmacist. Please keep your medication list with you and share with your provider. Update the information when medications are discontinued, doses are changed, or new medications (including over-the-counter products) are added; and carry medication " information at all times in the event of emergency situations     Allergies:  No Known Allergies          Medications  Valid as of: May 04, 2017 -  4:40 PM    Generic Name Brand Name Tablet Size Instructions for use    .                 Medicines prescribed today were sent to:     [facility]/PHARMACY #[id number] - [place], [place] - [address]    [address] [place] [address] [place] [address]    Phone: [phone] Fax: [phone]    Open 24 Hours?: No      Medication refill instructions:       If your prescription bottle indicates you have medication refills left, it is not necessary to call your provider’s office. Please contact your pharmacy and they will refill your medication.    If your prescription bottle indicates you do not have any refills left, you may request refills at any time through one of the following ways: The online cafegive system (except Urgent Care), by calling your provider’s office, or by asking your pharmacy to contact your provider’s office with a refill request. Medication refills are processed only during regular business hours and may not be available until the next business day. Your provider may request additional information or to have a follow-up visit with you prior to refilling your medication.   *Please Note: Medication refills are assigned a new Rx number when refilled electronically. Your pharmacy may indicate that no refills were authorized even though a new prescription for the same medication is available at the pharmacy. Please request the medicine by name with the pharmacy before contacting your provider for a refill.           cafegive Access Code: Activation code not generated  Current cafegive Status: Active

## 2017-05-05 DIAGNOSIS — R82.90 ABNORMAL URINE FINDINGS: ICD-10-CM

## 2017-05-07 LAB
BACTERIA UR CULT: ABNORMAL
BACTERIA UR CULT: ABNORMAL
SIGNIFICANT IND 70042: ABNORMAL
SOURCE SOURCE: ABNORMAL

## 2017-06-01 ENCOUNTER — HOSPITAL ENCOUNTER (OUTPATIENT)
Facility: MEDICAL CENTER | Age: 22
End: 2017-06-01
Attending: OBSTETRICS & GYNECOLOGY | Admitting: OBSTETRICS & GYNECOLOGY
Payer: COMMERCIAL

## 2017-06-01 VITALS — TEMPERATURE: 98 F | HEART RATE: 60 BPM | SYSTOLIC BLOOD PRESSURE: 115 MMHG | DIASTOLIC BLOOD PRESSURE: 59 MMHG

## 2017-06-01 LAB — GP B STREP DNA SPEC QL NAA+PROBE: NEGATIVE

## 2017-06-01 PROCEDURE — 59025 FETAL NON-STRESS TEST: CPT | Performed by: OBSTETRICS & GYNECOLOGY

## 2017-06-01 PROCEDURE — 87653 STREP B DNA AMP PROBE: CPT

## 2017-06-01 NOTE — PROGRESS NOTES
[facility] LABOR AND DELIVERY TRIAGE PROGRESS NOTE    PATIENT ID:  NAME:  Merissa Hyde  MRN:               [id number]  YOB: 1995     21 y.o. female  at 39w6d by LMP.    Subjective: Pt presents for evaluation of with contractions starting this am and increasing throughout the day to now, currently about 5 min apart.  She denies any LOF, VB, and reports normal fetal movement.   Patient has been aware of this pregnancy since 6w gestation but has not received prenatal care.  Her PCP was able to complete prenatal labs during the second trimester and she has one 2nd trimester u/s.      Other than the contractions she feels well without complaints  Pregnancy complicated by no prenatal care as above    positive  For CTXS.   positive Feels pain   negative for LOF  negative for vaginal bleeding.   positive for fetal movement    ROS: Patient denies any fever chills, nausea, vomiting, headache, chest pain, shortness of breath, or dysuria or unusual swelling of hands or feet.     Objective:    There were no vitals filed for this visit.  No data recorded.    General: No acute distress, resting comfortably in bed.  HEENT: normocephalic, nontraumatic, PERRLA, EOMI  Cardiovascular: Heart RRR with no murmurs, rubs or gallops. Distal Pulses 2+  Respiratory: symmetric chest expansion, lungs CTAB, with no wheezes, rales, rhonci  Abdomen: gravid, nontender  Musculoskeletal: strength 5/5 in four extremities  Neuro: non focal with no numbness, tingling or changes in sensation    Cervix:  2cm/75%/0 (remained unchanged after 1 hour of ambulaltion)  Yaphank: Uterine Contractions Q4-5 minutes.   FHRM: Baseline 140, Accels, no decels, moderate variability    Rapid GBS collected and pending    Assessment: 21 y.o. female  at 39w6d by LMP    Plan:   1. Discharge home with return precautions and instructions to return once contractions are unbearable, any LOF, VB or decrease in fetal movement          Discussed case with    CAR [place] Attending. Case was discussed and attending agreed with plan prior to discharge of patient.

## 2017-06-01 NOTE — PROGRESS NOTES
Patient came into triage stating that she hasn't had any prenatal care.she said that her primary doctor gave her a due date of June 2 by her LMP.she said she is having contractions.Dr Garcia and Dr Fernández notified.They are here to see patient.GBS done,SVE 2/70/-2.sent walking for 1 hour.1615 back from walking.rechecked.still 2cm's.Dr Fernández here to talk to patient about labor.1635 discharged home after given detailed labor instructions and kick count information.

## 2017-06-02 ENCOUNTER — HOSPITAL ENCOUNTER (INPATIENT)
Facility: MEDICAL CENTER | Age: 22
LOS: 1 days | End: 2017-06-04
Attending: OBSTETRICS & GYNECOLOGY | Admitting: OBSTETRICS & GYNECOLOGY
Payer: COMMERCIAL

## 2017-06-02 PROCEDURE — 86901 BLOOD TYPING SEROLOGIC RH(D): CPT

## 2017-06-02 PROCEDURE — 87340 HEPATITIS B SURFACE AG IA: CPT

## 2017-06-02 PROCEDURE — 86850 RBC ANTIBODY SCREEN: CPT

## 2017-06-02 PROCEDURE — 87389 HIV-1 AG W/HIV-1&-2 AB AG IA: CPT

## 2017-06-02 PROCEDURE — 86803 HEPATITIS C AB TEST: CPT

## 2017-06-02 PROCEDURE — 86780 TREPONEMA PALLIDUM: CPT

## 2017-06-02 PROCEDURE — 85025 COMPLETE CBC W/AUTO DIFF WBC: CPT

## 2017-06-02 PROCEDURE — 86762 RUBELLA ANTIBODY: CPT

## 2017-06-02 PROCEDURE — 86900 BLOOD TYPING SEROLOGIC ABO: CPT

## 2017-06-02 PROCEDURE — 36415 COLL VENOUS BLD VENIPUNCTURE: CPT

## 2017-06-02 RX ORDER — SODIUM CHLORIDE, SODIUM LACTATE, POTASSIUM CHLORIDE, CALCIUM CHLORIDE 600; 310; 30; 20 MG/100ML; MG/100ML; MG/100ML; MG/100ML
INJECTION, SOLUTION INTRAVENOUS CONTINUOUS
Status: DISPENSED | OUTPATIENT
Start: 2017-06-03 | End: 2017-06-03

## 2017-06-02 RX ORDER — MISOPROSTOL 200 UG/1
800 TABLET ORAL
Status: DISCONTINUED | OUTPATIENT
Start: 2017-06-02 | End: 2017-06-03 | Stop reason: HOSPADM

## 2017-06-02 NOTE — IP AVS SNAPSHOT
[employer] Access Code: Activation code not generated  Current [employer] Status: Active    Marinel[employer]hart  A secure, online tool to manage your health information     [employer]’s [employer]® is a secure, online tool that connects you to your personalized health information from the privacy of your home -- day or night - making it very easy for you to manage your healthcare. Once the activation process is completed, you can even access your medical information using the [employer] marcela, which is available for free in the Apple Marcela store or Google Play store.     [employer] provides the following levels of access (as shown below):   My Chart Features   [facility] Primary Care Doctor [facility]  Specialists [facility]  Urgent  Care Non-[facility]  Primary Care  Doctor   Email your healthcare team securely and privately 24/7 X X X X   Manage appointments: schedule your next appointment; view details of past/upcoming appointments X      Request prescription refills. X      View recent personal medical records, including lab and immunizations X X X X   View health record, including health history, allergies, medications X X X X   Read reports about your outpatient visits, procedures, consult and ER notes X X X X   See your discharge summary, which is a recap of your hospital and/or ER visit that includes your diagnosis, lab results, and care plan. X X       How to register for [employer]:  1. Go to  [email or web address].  2. Click on the Sign Up Now box, which takes you to the New Member Sign Up page. You will need to provide the following information:  a. Enter your [employer] Access Code exactly as it appears at the top of this page. (You will not need to use this code after you’ve completed the sign-up process. If you do not sign up before the expiration date, you must request a new code.)   b. Enter your date of birth.   c. Enter your home email address.   d. Click Submit, and follow the next screen’s instructions.  3. Create a [employer] ID. This will  be your [employer] login ID and cannot be changed, so think of one that is secure and easy to remember.  4. Create a [employer] password. You can change your password at any time.  5. Enter your Password Reset Question and Answer. This can be used at a later time if you forget your password.   6. Enter your e-mail address. This allows you to receive e-mail notifications when new information is available in [employer].  7. Click Sign Up. You can now view your health information.    For assistance activating your [employer] account, call [phone]

## 2017-06-02 NOTE — IP AVS SNAPSHOT
Home Care Instructions                                                                                                                Merissa Hyde   MRN: [id number]    Department:  POST PARTUM 31   2017           Follow-up Information     1. [facility] up with [facility] weeks.    Specialty:  OB/Gyn    Why:  postpartum follow up    Contact information    [address]  [place] [address] [phone]    Additional information:    From  [address], take the [facility] exit [address]) and head West on [address].   Just before [address], take the slight left fork onto [address].   [facility] is located on the right hand side, just past [facility].       I assume responsibility for securing a follow-up  Screening blood test on my baby within the specified date range.    -                  Discharge Instructions       POSTPARTUM DISCHARGE INSTRUCTIONS FOR MOM    YOB: 1995   Age: 21 y.o.               Admit Date: 2017     Discharge Date: 2017  Attending Doctor:  Cammie Mitchell*                  Allergies:  Review of patient's allergies indicates no known allergies.    Discharged to home by car. Discharged via wheelchair, hospital escort: Yes.  Special equipment needed: Not Applicable  Belongings with: Personal  Be sure to schedule a follow-up appointment with your primary care doctor or any specialists as instructed.     Discharge Plan:   Influenza Vaccine Indication: Indicated: Not available from distributor/    REASONS TO CALL YOUR OBSTETRICIAN:  1.   Persistent fever or shaking chills (Temperature higher than 100.4)  2.   Heavy bleeding (soaking more than 1 pad per hour); Passing clots  3.   Foul odor from vagina  4.   Mastitis (Breast infection; breast pain, chills, fever, redness)  5.   Urinary pain, burning or frequency  6.   Episiotomy infection  7.   Abdominal incision infection  8.   Severe depression longer than 24  "hours    HAND WASHING  · Prior to handling the baby.  · Before breastfeeding or bottle feeding baby.  · After using the bathroom or changing the baby's diaper.    WOUND CARE  Ask your physician for additional care instructions.  In general:    ·  Incision:      · Keep clean and dry.    · Do NOT lift anything heavier than your baby for up to 6 weeks.    · There should not be any opening or pus.      VAGINAL CARE  · Nothing inside vagina for 6 weeks: no sexual intercourse, tampons or douching.  · Bleeding may continue for 2-4 weeks.  Amount may vary.    · Call your physician for heavy bleeding which means soaking more than 1 pad per hour    BIRTH CONTROL  · It is possible to become pregnant at any time after delivery and while breastfeeding.  · Plan to discuss a method of birth control with your physician at your follow up visit. visit.    DIET AND ELIMINATION  · Eating more fiber (bran cereal, fruits, and vegetables) and drinking plenty of fluids will help to avoid constipation.  · Urinary frequency after childbirth is normal.    POSTPARTUM BLUES  During the first few days after birth, you may experience a sense of the \"blues\" which may include impatience, irritability or even crying.  These feeling come and go quickly.  However, as many as 1 in 10 women experience emotional symptoms known as postpartum depression.    Postpartum depression:  May start as early as the second or third day after delivery or take several weeks or months to develop.  Symptoms of \"blues\" are present, but are more intense:  Crying spells; loss of appetite; feelings of hopelessness or loss of control; fear of touching the baby; over concern or no concern at all about the baby; little or no concern about your own appearance/caring for yourself; and/or inability to sleep or excessive sleeping.  Contact your physician if you are experiencing any of these symptoms.    Crisis Hotline:  · National Crisis Hotline:  5-480-UVDDVBH  Or " "1-380.628.5733  · [place] Crisis Hotline:  1-882.660.4029  Or 034-303-0071    PREVENTING SHAKEN BABY:  If you are angry or stressed, PUT THE BABY IN THE CRIB, step away, take some deep breaths, and wait until you are calm to care for the baby.  DO NOT SHAKE THE BABY.  You are not alone, call a supporter for help.    · Crisis Call Center 24/7 crisis line 337-043-2609 or 1-221.866.2248  · You can also text them, text \"ANSWER\" to 248007    QUIT SMOKING/TOBACCO USE:  I understand the use of any tobacco products increases my chance of suffering from future heart disease and could cause other illnesses which may shorten my life. Quitting the use of tobacco products is the single most important thing I can do to improve my health. For further information on smoking / tobacco cessation call a Toll Free Quit Line at 1-555.962.7823 (*National [employer] [place]) or 1-645.405.8551 (American Lung Association) or you can access the web based program at www.lungusa.org.    · [place] Tobacco Users Help Line:  (753) 483-5356       Toll Free: 1-860.900.6502  · Quit Tobacco Program [facility] (844)112-3427    DEPRESSION / SUICIDE RISK:  As you are discharged from this [facility] facility, it is important to learn how to keep safe from harming yourself.    Recognize the warning signs:  · Abrupt changes in personality, positive or negative- including increase in energy   · Giving away possessions  · Change in eating patterns- significant weight changes-  positive or negative  · Change in sleeping patterns- unable to sleep or sleeping all the time   · Unwillingness or inability to communicate  · Depression  · Unusual sadness, discouragement and loneliness  · Talk of wanting to die  · Neglect of personal appearance   · Rebelliousness- reckless behavior  · Withdrawal from people/activities they love  · Confusion- inability to concentrate     If you or a loved one observes any of these behaviors or has concerns about " self-harm, here's what you can do:  · Talk about it- your feelings and reasons for harming yourself  · Remove any means that you might use to hurt yourself (examples: pills, rope, extension cords, firearm)  · Get professional help from the community (Mental Health, Substance Abuse, psychological counseling)  · Do not be alone:Call your Safe Contact- someone whom you trust who will be there for you.  · Call your local CRISIS HOTLINE 640-0690 or 362-220-5492  · Call your local Children's Mobile Crisis Response Team [place] (759) 498-1416 or www.GoLocal24  · Call the toll free National Suicide Prevention Hotlines   · National Suicide Prevention Lifeline 876-953-WLRR (1933)  · National Hope Line Network 800-SUICIDE (721-7079)    DISCHARGE SURVEY:  Thank you for choosing [facility].  We hope we provided you with very good care.  You may be receiving a survey in the mail.  Please fill it out.  Your opinion is valuable to us.    ADDITIONAL EDUCATIONAL MATERIALS GIVEN TO PATIENT:        My signature on this form indicates that:  1.  I have reviewed and understand the above information  2.  My questions regarding this information have been answered to my satisfaction.  3.  I have formulated a plan with my discharge nurse to obtain my prescribed medication for home.         Discharge Medication Instructions:    Below are the medications your physician expects you to take upon discharge:    Review all your home medications and newly ordered medications with your doctor and/or pharmacist. Follow medication instructions as directed by your doctor and/or pharmacist.    Please keep your medication list with you and share with your physician.               Medication List      START taking these medications        Instructions    Morning Afternoon Evening Bedtime    ibuprofen 800 MG Tabs   Last time this was given:  800 mg on 6/4/2017  2:44 AM   Commonly known as:  MOTRIN        Take 1 Tab by mouth every 8 hours as  needed (For cramping after delivery; do not give if patient is receiving ketorolac (Toradol)).   Dose:  800 mg                             Where to Get Your Medications      Information about where to get these medications is not yet available     ! Ask your nurse or doctor about these medications    - ibuprofen 800 MG Tabs            Crisis Hotline:     [place] Crisis Hotline:  6-513-FPQEMIU or 1-385.107.4846    [place] Crisis Hotline:    1-791.837.4024 or 900-988-2106        Disclaimer           _____________________________________                     __________       ________       Patient/Mother Signature or Legal                          Date                   Time

## 2017-06-02 NOTE — IP AVS SNAPSHOT
6/4/2017    Merissa Hyde  [address] [place] [place] [address]    Dear Merissa:    [facility] wants to ensure your discharge home is safe and you or your loved ones have had all of your questions answered regarding your care after you leave the hospital.    Below is a list of resources and contact information should you have any questions regarding your hospital stay, follow-up instructions, or active medical symptoms.    Questions or Concerns Regarding… Contact   Medical Questions Related to Your Discharge  (7 days a week, 8am-5pm) Contact a Nurse Care Coordinator   [phone]   Medical Questions Not Related to Your Discharge  (24 hours a day / 7 days a week)  Contact the Nurse Health Line   [phone]    Medications or Discharge Instructions Refer to your discharge packet   or contact your [facility] Primary Care Provider   [phone]   Follow-up Appointment(s) Schedule your appointment via [employer]   or contact Scheduling [phone]   Billing Review your statement via [employer]  or contact Billing [phone]   Medical Records Review your records via [employer]   or contact Medical Records [phone]     You may receive a telephone call within two days of discharge. This call is to make certain you understand your discharge instructions and have the opportunity to have any questions answered. You can also easily access your medical information, test results and upcoming appointments via the [employer] free online health management tool. You can learn more and sign up at UserEvents/[email or web address]. For assistance setting up your [employer] account, please call [phone].    Once again, we want to ensure your discharge home is safe and that you have a clear understanding of any next steps in your care. If you have any questions or concerns, please do not hesitate to contact us, we are here for you. Thank you for choosing [facility] for your healthcare needs.    Sincerely,    Your [facility] Healthcare Team

## 2017-06-03 LAB
ABO GROUP BLD: NORMAL
AMPHET UR QL SCN: NEGATIVE
BARBITURATES UR QL SCN: NEGATIVE
BASOPHILS # BLD AUTO: 0.5 % (ref 0–1.8)
BASOPHILS # BLD: 0.05 K/UL (ref 0–0.12)
BENZODIAZ UR QL SCN: NEGATIVE
BLD GP AB SCN SERPL QL: NORMAL
BZE UR QL SCN: NEGATIVE
CANNABINOIDS UR QL SCN: POSITIVE
EOSINOPHIL # BLD AUTO: 0.02 K/UL (ref 0–0.51)
EOSINOPHIL NFR BLD: 0.2 % (ref 0–6.9)
ERYTHROCYTE [DISTWIDTH] IN BLOOD BY AUTOMATED COUNT: 43.5 FL (ref 35.9–50)
HBV SURFACE AG SER QL: NEGATIVE
HCT VFR BLD AUTO: 32.7 % (ref 37–47)
HCV AB SER QL: NEGATIVE
HGB BLD-MCNC: 11.3 G/DL (ref 12–16)
HIV 1+2 AB+HIV1 P24 AG SERPL QL IA: NON REACTIVE
HOLDING TUBE BB 8507: NORMAL
IMM GRANULOCYTES # BLD AUTO: 0.03 K/UL (ref 0–0.11)
IMM GRANULOCYTES NFR BLD AUTO: 0.3 % (ref 0–0.9)
LYMPHOCYTES # BLD AUTO: 2.34 K/UL (ref 1–4.8)
LYMPHOCYTES NFR BLD: 25.4 % (ref 22–41)
MCH RBC QN AUTO: 32.4 PG (ref 27–33)
MCHC RBC AUTO-ENTMCNC: 34.6 G/DL (ref 33.6–35)
MCV RBC AUTO: 93.7 FL (ref 81.4–97.8)
MDMA UR QL SCN: NEGATIVE
METHADONE UR QL SCN: NEGATIVE
MONOCYTES # BLD AUTO: 0.5 K/UL (ref 0–0.85)
MONOCYTES NFR BLD AUTO: 5.4 % (ref 0–13.4)
NEUTROPHILS # BLD AUTO: 6.29 K/UL (ref 2–7.15)
NEUTROPHILS NFR BLD: 68.2 % (ref 44–72)
NRBC # BLD AUTO: 0 K/UL
NRBC BLD AUTO-RTO: 0 /100 WBC
OPIATES UR QL SCN: NEGATIVE
OXYCODONE UR QL SCN: NEGATIVE
PCP UR QL SCN: NEGATIVE
PLATELET # BLD AUTO: 198 K/UL (ref 164–446)
PMV BLD AUTO: 12.3 FL (ref 9–12.9)
PROPOXYPH UR QL SCN: NEGATIVE
RBC # BLD AUTO: 3.49 M/UL (ref 4.2–5.4)
RH BLD: NORMAL
RUBV AB SER QL: 44.7 IU/ML
TREPONEMA PALLIDUM IGG+IGM AB [PRESENCE] IN SERUM OR PLASMA BY IMMUNOASSAY: NON REACTIVE
WBC # BLD AUTO: 9.2 K/UL (ref 4.8–10.8)

## 2017-06-03 PROCEDURE — 700102 HCHG RX REV CODE 250 W/ 637 OVERRIDE(OP): Performed by: NURSE PRACTITIONER

## 2017-06-03 PROCEDURE — 4A1H74Z MONITORING OF PRODUCTS OF CONCEPTION, CARDIAC ELECTRICAL ACTIVITY, VIA NATURAL OR ARTIFICIAL OPENING: ICD-10-PCS | Performed by: OBSTETRICS & GYNECOLOGY

## 2017-06-03 PROCEDURE — 10H07YZ INSERTION OF OTHER DEVICE INTO PRODUCTS OF CONCEPTION, VIA NATURAL OR ARTIFICIAL OPENING: ICD-10-PCS | Performed by: OBSTETRICS & GYNECOLOGY

## 2017-06-03 PROCEDURE — 700111 HCHG RX REV CODE 636 W/ 250 OVERRIDE (IP)

## 2017-06-03 PROCEDURE — 59409 OBSTETRICAL CARE: CPT

## 2017-06-03 PROCEDURE — 304965 HCHG RECOVERY SERVICES

## 2017-06-03 PROCEDURE — 80307 DRUG TEST PRSMV CHEM ANLYZR: CPT

## 2017-06-03 PROCEDURE — 36415 COLL VENOUS BLD VENIPUNCTURE: CPT

## 2017-06-03 PROCEDURE — 0HQ9XZZ REPAIR PERINEUM SKIN, EXTERNAL APPROACH: ICD-10-PCS | Performed by: OBSTETRICS & GYNECOLOGY

## 2017-06-03 PROCEDURE — 4A1HX4Z MONITORING OF PRODUCTS OF CONCEPTION, CARDIAC ELECTRICAL ACTIVITY, EXTERNAL APPROACH: ICD-10-PCS | Performed by: OBSTETRICS & GYNECOLOGY

## 2017-06-03 PROCEDURE — 700111 HCHG RX REV CODE 636 W/ 250 OVERRIDE (IP): Performed by: OBSTETRICS & GYNECOLOGY

## 2017-06-03 PROCEDURE — 770002 HCHG ROOM/CARE - OB PRIVATE (112)

## 2017-06-03 PROCEDURE — 700111 HCHG RX REV CODE 636 W/ 250 OVERRIDE (IP): Performed by: NURSE PRACTITIONER

## 2017-06-03 PROCEDURE — A9270 NON-COVERED ITEM OR SERVICE: HCPCS | Performed by: NURSE PRACTITIONER

## 2017-06-03 PROCEDURE — 303615 HCHG EPIDURAL/SPINAL ANESTHESIA FOR LABOR

## 2017-06-03 PROCEDURE — 700105 HCHG RX REV CODE 258: Performed by: NURSE PRACTITIONER

## 2017-06-03 RX ORDER — ONDANSETRON 4 MG/1
TABLET, ORALLY DISINTEGRATING ORAL
Status: COMPLETED
Start: 2017-06-03 | End: 2017-06-03

## 2017-06-03 RX ORDER — OXYCODONE AND ACETAMINOPHEN 10; 325 MG/1; MG/1
1 TABLET ORAL EVERY 4 HOURS PRN
Status: DISCONTINUED | OUTPATIENT
Start: 2017-06-03 | End: 2017-06-04 | Stop reason: HOSPADM

## 2017-06-03 RX ORDER — SODIUM CHLORIDE, SODIUM LACTATE, POTASSIUM CHLORIDE, CALCIUM CHLORIDE 600; 310; 30; 20 MG/100ML; MG/100ML; MG/100ML; MG/100ML
INJECTION, SOLUTION INTRAVENOUS
Status: ACTIVE
Start: 2017-06-03 | End: 2017-06-03

## 2017-06-03 RX ORDER — ROPIVACAINE HYDROCHLORIDE 2 MG/ML
INJECTION, SOLUTION EPIDURAL; INFILTRATION; PERINEURAL
Status: COMPLETED
Start: 2017-06-03 | End: 2017-06-03

## 2017-06-03 RX ORDER — IBUPROFEN 800 MG/1
800 TABLET ORAL EVERY 8 HOURS PRN
Status: DISCONTINUED | OUTPATIENT
Start: 2017-06-03 | End: 2017-06-04 | Stop reason: HOSPADM

## 2017-06-03 RX ORDER — ONDANSETRON 2 MG/ML
4 INJECTION INTRAMUSCULAR; INTRAVENOUS EVERY 6 HOURS PRN
Status: DISCONTINUED | OUTPATIENT
Start: 2017-06-03 | End: 2017-06-04 | Stop reason: HOSPADM

## 2017-06-03 RX ORDER — OXYCODONE HYDROCHLORIDE AND ACETAMINOPHEN 5; 325 MG/1; MG/1
1 TABLET ORAL EVERY 4 HOURS PRN
Status: DISCONTINUED | OUTPATIENT
Start: 2017-06-03 | End: 2017-06-04 | Stop reason: HOSPADM

## 2017-06-03 RX ADMIN — Medication 125 ML/HR: at 16:49

## 2017-06-03 RX ADMIN — SODIUM CHLORIDE, POTASSIUM CHLORIDE, SODIUM LACTATE AND CALCIUM CHLORIDE: 600; 310; 30; 20 INJECTION, SOLUTION INTRAVENOUS at 08:29

## 2017-06-03 RX ADMIN — ONDANSETRON 4 MG: 2 INJECTION INTRAMUSCULAR; INTRAVENOUS at 06:47

## 2017-06-03 RX ADMIN — IBUPROFEN 800 MG: 800 TABLET, FILM COATED ORAL at 18:45

## 2017-06-03 RX ADMIN — OXYTOCIN 1 MILLI-UNITS/MIN: 10 INJECTION, SOLUTION INTRAMUSCULAR; INTRAVENOUS at 12:17

## 2017-06-03 RX ADMIN — ROPIVACAINE HYDROCHLORIDE 200 MG: 2 INJECTION, SOLUTION EPIDURAL; INFILTRATION at 00:53

## 2017-06-03 RX ADMIN — SODIUM CHLORIDE, POTASSIUM CHLORIDE, SODIUM LACTATE AND CALCIUM CHLORIDE: 600; 310; 30; 20 INJECTION, SOLUTION INTRAVENOUS at 01:09

## 2017-06-03 RX ADMIN — ROPIVACAINE HYDROCHLORIDE 200 MG: 2 INJECTION, SOLUTION EPIDURAL; INFILTRATION at 09:38

## 2017-06-03 RX ADMIN — Medication 2000 ML/HR: at 16:00

## 2017-06-03 ASSESSMENT — LIFESTYLE VARIABLES
EVER_SMOKED: YES
ALCOHOL_USE: NO

## 2017-06-03 ASSESSMENT — PAIN SCALES - GENERAL
PAINLEVEL_OUTOF10: 3
PAINLEVEL_OUTOF10: 1

## 2017-06-03 NOTE — PROGRESS NOTES
Per pt she contacted adoption choices 2 months, and has selected a family that will be here tomorrow. Pt very tearful and asked RN to stop talking about the situation.  paged to update them with this new information.

## 2017-06-03 NOTE — H&P
"  History and Physical    Merissa Hyde is a 21 y.o. female  -Para:     Gestational Age:  Unknown  Admitted for:   Active Labor  Admitted to  [facility] Labor [facility] Delivery.  Patient received prenatal care: no prenatal care previously    HPI: Patient is admitted with the above mentioned Chief Complaint and States   Loss of fluid:   negative  Abdominal Pain:  positive  Uterine Contractions:  positive  Vaginal Bleeding:  negative  Fetal Movement:  normal  Patient denies fever, chills, nausea, vomiting , headache, visual disturbance, or dysuria  Patient's last menstrual period was 2016.  Estimated Date of Delivery: None noted.  Final JANIE: Not found.    Patient Active Problem List    Diagnosis Date Noted   • No prenatal care in current pregnancy 2017   • Vitamin D deficiency disease 2017   • Anemia 2017   • Pregnancy 2017   • Migraine without status migrainosus, not intractable 2017       Admitting DX: Pregnancy  Pregnancy Complications:  maternal smoking, no prenatal care,   OB Risk Factors:     Labor State:    Active phase labor.    History:   has no past medical history on file.     has no past surgical history on file.    OB History    Para Term  AB SAB TAB Ectopic Multiple Living   1               # Outcome Date GA Lbr Mahad/2nd Weight Sex Delivery Anes PTL Lv   1 Current               Obstetric Comments   Patient has not had prenatal care. LMP 2016 with EDC 2017. No US to verify dates        Medications:  No current facility-administered medications on file prior to encounter.     No current outpatient prescriptions on file prior to encounter.       Allergies:  Review of patient's allergies indicates no known allergies.    ROS:   Neuro: negative    Cardiovascular: negative  Gastro intestinal: negative  Genitourinary: negative            Physical Exam:  /63 mmHg  Pulse 69  Temp(Src) 37.1 °C (98.8 °F) (Temporal)  Ht 1.549 m (5' 1\")  Wt " 59.421 kg (131 lb)  BMI 24.76 kg/m2  LMP 08/24/2016  Constitutional: healthy-appearing, Well-developed, well-nourished  No JVD: while supine  HEENT: PERRLA  Breast Exam: negative  Cardio: regular rate and rhythm, S1, S2 normal, no murmur, click, rub or gallop  Lung: unlabored respirations, no intercostal retractions or accessory muscle use  Abdomen: abdomen is soft without significant tenderness, masses, organomegaly or guarding  Extremity: extremities, peripheral pulses and reflexes normal    Cervical Exam: 90%  Cervix Dilatation: 4  Station: negative 2  Pelvis: Adequate  Fetal Assessment: Fetal heart variability: moderate tachycardia at 160-170s  Says has been vaping  nicotene and has been drinking caffinated beverages, DOA urine pendin  Estimated Fetal Weight: 2500 - 3000g      Labs:      Prenatal Results     The patient does not have an associated pregnancy episode and working JANIE. Some results will not display without a pregnancy episode and working JANIE.        1st Trimester Date Time   ABO      RH      Antibody      CBC/PLT/DIFF      HGB      Platelets      HGB A1C       1 Hr GCT      3 Hr GTT      Rubella      RPR      Urine Culture      24 Urine Protein       24 Urine Creatinine       HBsAg      Hep CAB       HIV      Gonorrhea      Chlamydia      TSH       Free T4        TB      Pap      SYPHILUS TREP QUAL [id number] [[id number]][      2nd Trimester Date Time   HCT      HGB      1 Hr GCT      3 Hr GTT      24-28 Weeks Date Time   1 Hr GCT       TSH       Free T4       24 Urine Protein      24 Urine Creatinine      BUN      Creatinine      GFR      AST      ALT      Uric Acid      LDH      3rd Trimester Date Time   HCT      HGB      TSH      Free T4      24 Hr Urine Protein      24 Hr Urine Creatinine      SYPHILUS TREP QUAL      35-37 Weeks Date Time   GBS PCR LB      GBS PCR      Genetic Screening Date Time   Cystic Fibrosis      AFP Quad      Sickle Cell                  View all results for this pregnancy     "        Assessment:  Gestational Age:  Unknown  Labor State:   Labor, Active  Risk Factors:   Walk-in, no prenatal care, adopting baby out, cigarette smoker, anemia,   Pregnancy Complications: no prenatal care, says went to her general practitioner who does not do OB care.  Says called \"every office in town\" and was told she was too far along and no one would take her.  Says she has arranged adoption through an agency, they are contacting the adoptive parents.    Patient Active Problem List    Diagnosis Date Noted   • No prenatal care in current pregnancy 2017   • Vitamin D deficiency disease 2017   • Anemia 2017   • Pregnancy 2017   • Migraine without status migrainosus, not intractable 2017       Plan:   Admitted for: Active Labor    CBC  Urine DOA  Prenatal panel  GBS had been done, neg  JANIE is / by LMP and  by the only US we have results for  Antibiotics: GBS neg    Wanda Tolbert, A.P.N.                "

## 2017-06-03 NOTE — PROGRESS NOTES
"S: Pt comfortable with epidural.    O:Blood pressure 115/71, pulse 59, temperature 36.4 °C (97.5 °F), temperature source Temporal, height 1.549 m (5' 1\"), weight 59.421 kg (131 lb), last menstrual period 2016.     IUPC - q 2-3 min  FHTs - 130s, moderate variability, + accels  Cx - 5-6/100%/+1    A/P: IUP @ approximately 40+1/7 wks presented in active labor.  Fetal status reassuring.  Begin pitocin augmentation. Anticipate .  "

## 2017-06-03 NOTE — PROGRESS NOTES
"2220: Pt is a  who arrives to labor and delivery reporting \"I'm in labor\", pt reports she has had no prenatal care and states \"I had sex 9 months ago, and today is my due date\". Pt had one OB US on 2017, giving her an EDC of 2017 making EGA 37.5, but based on LMP she reports EDC of 2017. GBS negative, which was collected 2017 when pt came in for a labor check. Pt reports +FM, denies LOF, denies VB. Pt's partner at bedside but reports \"he is not the father of the baby\". When asked why she did not received prenatal care pt reports \"I wasn't sure what I wanted to do with the baby\". Oriented to room and call light system. Pt crying out in pain with UC's. Pt reports she is unable to give a UDS at this time. Oral hydration provided. Call light in reach. SVE 3/80/0. Pt reporting she cannot walk cause it \"hurts too much\".   Report given to BECKY Tolbert CNM, orders received.   2330: SVE 3-0. BECKY Tolbert CNM called to assess pt and to assess labor strip.   0010: VIRGEN Matos RN at bedside to transfer pt to labor room. Report given. IV access started. Pt tolerated well.   "

## 2017-06-03 NOTE — PROGRESS NOTES
"0010-report from FLAVIO Sands RN. Pt transferred to S216 via wheelchair, states she wants an epidural as soon as possible. Pt states her baby is to be adopted out and she contacted the adoptive agency today \"but they haven't gotten back to me.\"  Pt doesn't want to see the baby and wants infant to go to nursery right after delivery. RN explained to patient that the medical team needs to stabilize the infant before infant can transfer to nursery. Pt agreeable to this but wants infant taken out of room once stabilized. Pt tearful, RN in room often for comfort. Questions answered.   0110-pt c/o feeling leaking of warm fluid. Mod amount of clear fluid found on pad. SROM at this time.   0340-P Didi in room to see patient. CNM wants to wait to check patient until she feels pressure.   0615-P Didi notified to discuss POC - CNM wants to check patient soon.   0700-report to  KATHRIN Hinton RN.   "

## 2017-06-03 NOTE — DELIVERY NOTE
[facility] SPONTANEOUS VAGINAL DELIVERY PRODEDURE NOTE    PATIENT ID:  NAME:  Merissa Hyde  MRN:               [id number]  YOB: 1995    On 6/3/2017 at 15:53, this 21 y.o., @ 40w2d now  , GBS neg female delivered via  under spinal anesthesia a viable female infant weight lbs with APGAR scores of 8 and 9 at one and five minutes. There was no nuchal cord and infant was bulb suctioned at delivery. Cord was doubly clamped, cut and infant handed to RN in attendance. An intact placenta was delivered spontaneously at 16:00 with 3 vessel cord. Upon vaginal exam, there was small 1st degree laceration which was repaired using 3.0 chromic in the usual sterile fashion. Estimated blood loss was 300cc. Patient will be transferred to postpartum in stable condition and infant to  nursery.    Gianni Fernández M.D.    Delivery attended by Dr. Don who was present for the entire delivery.
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
## 2017-06-04 VITALS
HEIGHT: 61 IN | TEMPERATURE: 96.9 F | DIASTOLIC BLOOD PRESSURE: 65 MMHG | SYSTOLIC BLOOD PRESSURE: 128 MMHG | BODY MASS INDEX: 24.73 KG/M2 | OXYGEN SATURATION: 97 % | RESPIRATION RATE: 18 BRPM | WEIGHT: 131 LBS | HEART RATE: 62 BPM

## 2017-06-04 LAB
BASOPHILS # BLD AUTO: 0.1 % (ref 0–1.8)
BASOPHILS # BLD: 0.01 K/UL (ref 0–0.12)
EOSINOPHIL # BLD AUTO: 0.03 K/UL (ref 0–0.51)
EOSINOPHIL NFR BLD: 0.3 % (ref 0–6.9)
ERYTHROCYTE [DISTWIDTH] IN BLOOD BY AUTOMATED COUNT: 46 FL (ref 35.9–50)
HCT VFR BLD AUTO: 30.7 % (ref 37–47)
HGB BLD-MCNC: 10.2 G/DL (ref 12–16)
IMM GRANULOCYTES # BLD AUTO: 0.04 K/UL (ref 0–0.11)
IMM GRANULOCYTES NFR BLD AUTO: 0.5 % (ref 0–0.9)
LYMPHOCYTES # BLD AUTO: 1.77 K/UL (ref 1–4.8)
LYMPHOCYTES NFR BLD: 20.2 % (ref 22–41)
MCH RBC QN AUTO: 32.4 PG (ref 27–33)
MCHC RBC AUTO-ENTMCNC: 33.2 G/DL (ref 33.6–35)
MCV RBC AUTO: 97.5 FL (ref 81.4–97.8)
MONOCYTES # BLD AUTO: 0.47 K/UL (ref 0–0.85)
MONOCYTES NFR BLD AUTO: 5.4 % (ref 0–13.4)
NEUTROPHILS # BLD AUTO: 6.44 K/UL (ref 2–7.15)
NEUTROPHILS NFR BLD: 73.5 % (ref 44–72)
NRBC # BLD AUTO: 0 K/UL
NRBC BLD AUTO-RTO: 0 /100 WBC
PLATELET # BLD AUTO: 154 K/UL (ref 164–446)
PMV BLD AUTO: 12 FL (ref 9–12.9)
RBC # BLD AUTO: 3.15 M/UL (ref 4.2–5.4)
WBC # BLD AUTO: 8.8 K/UL (ref 4.8–10.8)

## 2017-06-04 PROCEDURE — 700102 HCHG RX REV CODE 250 W/ 637 OVERRIDE(OP): Performed by: NURSE PRACTITIONER

## 2017-06-04 PROCEDURE — 36415 COLL VENOUS BLD VENIPUNCTURE: CPT

## 2017-06-04 PROCEDURE — A9270 NON-COVERED ITEM OR SERVICE: HCPCS | Performed by: NURSE PRACTITIONER

## 2017-06-04 PROCEDURE — 700111 HCHG RX REV CODE 636 W/ 250 OVERRIDE (IP): Performed by: NURSE PRACTITIONER

## 2017-06-04 PROCEDURE — 85025 COMPLETE CBC W/AUTO DIFF WBC: CPT

## 2017-06-04 RX ORDER — IBUPROFEN 800 MG/1
800 TABLET ORAL EVERY 8 HOURS PRN
Qty: 30 TAB | Refills: 2 | Status: SHIPPED | OUTPATIENT
Start: 2017-06-04 | End: 2018-06-15

## 2017-06-04 RX ORDER — MEDROXYPROGESTERONE ACETATE 150 MG/ML
150 INJECTION, SUSPENSION INTRAMUSCULAR ONCE
Status: COMPLETED | OUTPATIENT
Start: 2017-06-04 | End: 2017-06-04

## 2017-06-04 RX ADMIN — IBUPROFEN 800 MG: 800 TABLET, FILM COATED ORAL at 02:44

## 2017-06-04 RX ADMIN — IBUPROFEN 800 MG: 800 TABLET, FILM COATED ORAL at 14:12

## 2017-06-04 RX ADMIN — MEDROXYPROGESTERONE ACETATE 150 MG: 150 INJECTION, SUSPENSION INTRAMUSCULAR at 10:16

## 2017-06-04 RX ADMIN — OXYCODONE HYDROCHLORIDE AND ACETAMINOPHEN 1 TABLET: 5; 325 TABLET ORAL at 00:10

## 2017-06-04 ASSESSMENT — PAIN SCALES - GENERAL
PAINLEVEL_OUTOF10: 2
PAINLEVEL_OUTOF10: 1
PAINLEVEL_OUTOF10: 6
PAINLEVEL_OUTOF10: 1
PAINLEVEL_OUTOF10: 6

## 2017-06-04 NOTE — PROGRESS NOTES
"    Patient called for assistance at approximately 2130- MACK Estrada, answered light. Patient requesting linen change, she didn't make it to the bathroom. I was on my way to another room to get another patient set up for using a breastpump.    Went to patient room around 2200 to see if she was ready to go to nursery to watch bath, and hep B vaccine for baby. Patient told me, \"I called about 30 minutes ago, and some girl told me she would come back, and she never did.\" I apologized to patient, as she was standing changing the blue yara pad on her bed. She asked me for a gown.     I apologized on the way out, and went to linen room to get a fresh gown, and change of sheets. I saw JACQUIE Sotelo in linen room, and she said she would bring new linen to room 334 for her. After that, I went to nursery to get donor breast milk for another patient, and spent time in room 335. 2220 I went back to room 334, and saw Ashleigh already in there, and patient crying. Patient was sitting in bed, with complaints of pain, pointing towards her abdomen.   \"The needle in my hand was hurting and I was in so much pain,\" and  Ashleigh stated, \"She just told me she fell.\" The boyfriend LADY, said, \"She really didn't fall, but I assisted her to her knees, and then she laid on the floor.\" Ashleigh asked, \" Why did you not call?\" And patient said, \"Well, no one is answering my light.\"   Eventually, boyfriend assisted patient back to bed consoling patient. Patient assisted out of bed, to wheelchair to nursery to watch bath.     "

## 2017-06-04 NOTE — PROGRESS NOTES
Reviewed discharge paperwork with patient, patient verbalized understanding of education and follow up appointments. Updated Meeta GIRON of status of discharge.

## 2017-06-04 NOTE — DISCHARGE INSTRUCTIONS
POSTPARTUM DISCHARGE INSTRUCTIONS FOR MOM    YOB: 1995   Age: 21 y.o.               Admit Date: 2017     Discharge Date: 2017  Attending Doctor:  Cammie Mitchell*                  Allergies:  Review of patient's allergies indicates no known allergies.    Discharged to home by car. Discharged via wheelchair, hospital escort: Yes.  Special equipment needed: Not Applicable  Belongings with: Personal  Be sure to schedule a follow-up appointment with your primary care doctor or any specialists as instructed.     Discharge Plan:   Influenza Vaccine Indication: Indicated: Not available from distributor/    REASONS TO CALL YOUR OBSTETRICIAN:  1.   Persistent fever or shaking chills (Temperature higher than 100.4)  2.   Heavy bleeding (soaking more than 1 pad per hour); Passing clots  3.   Foul odor from vagina  4.   Mastitis (Breast infection; breast pain, chills, fever, redness)  5.   Urinary pain, burning or frequency  6.   Episiotomy infection  7.   Abdominal incision infection  8.   Severe depression longer than 24 hours    HAND WASHING  · Prior to handling the baby.  · Before breastfeeding or bottle feeding baby.  · After using the bathroom or changing the baby's diaper.    WOUND CARE  Ask your physician for additional care instructions.  In general:    ·  Incision:      · Keep clean and dry.    · Do NOT lift anything heavier than your baby for up to 6 weeks.    · There should not be any opening or pus.      VAGINAL CARE  · Nothing inside vagina for 6 weeks: no sexual intercourse, tampons or douching.  · Bleeding may continue for 2-4 weeks.  Amount may vary.    · Call your physician for heavy bleeding which means soaking more than 1 pad per hour    BIRTH CONTROL  · It is possible to become pregnant at any time after delivery and while breastfeeding.  · Plan to discuss a method of birth control with your physician at your follow up visit. visit.    DIET AND  "ELIMINATION  · Eating more fiber (bran cereal, fruits, and vegetables) and drinking plenty of fluids will help to avoid constipation.  · Urinary frequency after childbirth is normal.    POSTPARTUM BLUES  During the first few days after birth, you may experience a sense of the \"blues\" which may include impatience, irritability or even crying.  These feeling come and go quickly.  However, as many as 1 in 10 women experience emotional symptoms known as postpartum depression.    Postpartum depression:  May start as early as the second or third day after delivery or take several weeks or months to develop.  Symptoms of \"blues\" are present, but are more intense:  Crying spells; loss of appetite; feelings of hopelessness or loss of control; fear of touching the baby; over concern or no concern at all about the baby; little or no concern about your own appearance/caring for yourself; and/or inability to sleep or excessive sleeping.  Contact your physician if you are experiencing any of these symptoms.    Crisis Hotline:  · [place] Crisis Hotline:  6-250-WKJPKXL  Or 1-207.728.6231  · [place] Crisis Hotline:  1-574.104.1882  Or 969-326-6060    PREVENTING SHAKEN BABY:  If you are angry or stressed, PUT THE BABY IN THE CRIB, step away, take some deep breaths, and wait until you are calm to care for the baby.  DO NOT SHAKE THE BABY.  You are not alone, call a supporter for help.    · Crisis Call Center 24/7 crisis line 264-595-7033 or 1-354.955.8654  · You can also text them, text \"ANSWER\" to 603525    QUIT SMOKING/TOBACCO USE:  I understand the use of any tobacco products increases my chance of suffering from future heart disease and could cause other illnesses which may shorten my life. Quitting the use of tobacco products is the single most important thing I can do to improve my health. For further information on smoking / tobacco cessation call a Toll Free Quit Line at 1-288.534.2423 (*National Cancer [place]) or " 1-914.918.3771 (American Lung Association) or you can access the web based program at www.lungusa.org.    · [place] Tobacco Users Help Line:  (431) 751-3076       Toll Free: 1-254.133.4598  · Quit Tobacco Program [facility] (866)496-4121    DEPRESSION / SUICIDE RISK:  As you are discharged from this [facility], it is important to learn how to keep safe from harming yourself.    Recognize the warning signs:  · Abrupt changes in personality, positive or negative- including increase in energy   · Giving away possessions  · Change in eating patterns- significant weight changes-  positive or negative  · Change in sleeping patterns- unable to sleep or sleeping all the time   · Unwillingness or inability to communicate  · Depression  · Unusual sadness, discouragement and loneliness  · Talk of wanting to die  · Neglect of personal appearance   · Rebelliousness- reckless behavior  · Withdrawal from people/activities they love  · Confusion- inability to concentrate     If you or a loved one observes any of these behaviors or has concerns about self-harm, here's what you can do:  · Talk about it- your feelings and reasons for harming yourself  · Remove any means that you might use to hurt yourself (examples: pills, rope, extension cords, firearm)  · Get professional help from the community (Mental Health, Substance Abuse, psychological counseling)  · Do not be alone:Call your Safe Contact- someone whom you trust who will be there for you.  · Call your local CRISIS HOTLINE 431-4443 or 389-749-4319  · Call your local Children's Mobile Crisis Response Team [place] (031) 961-8218 or www.[employer]  · Call the toll free National Suicide Prevention Hotlines   · National Suicide Prevention Lifeline 807-879-LYRF (4953)  · National Hope Line Network 800-SUICIDE (518-7813)    DISCHARGE SURVEY:  Thank you for choosing [facility].  We hope we provided you with very good care.  You may be  receiving a survey in the mail.  Please fill it out.  Your opinion is valuable to us.    ADDITIONAL EDUCATIONAL MATERIALS GIVEN TO PATIENT:        My signature on this form indicates that:  1.  I have reviewed and understand the above information  2.  My questions regarding this information have been answered to my satisfaction.  3.  I have formulated a plan with my discharge nurse to obtain my prescribed medication for home.

## 2017-06-04 NOTE — PROGRESS NOTES
" - Floor RN called this RN and stated that patient and FOB were on their way down to the NBN for the infant's bath and Hep B vaccine, and that patient was crying and tearful.    - Patient and FOB arrived to [facility] with infant for the bath and Hep B vaccine. Bath was given by CNA, patient, tearful watched as well as FOB watched infant bath.    - This RN saw patient and FOB in the middle of the hallway on [address] by the NBN, MOB sitting in wheelchair crying and FOB consoling her. This RN asked patient what was wrong and if I could help assist them back to their room. Patient and FOB stated they would like help back to their room and patient (crying) stated, \"Nothing is going right tonight.\"  Assisted patient and FOB back to room S334, This RN assisted patient back to bed from wheelchair with help from FOB. During the transfer back to bed, patient crying and moaning in pain stating, \"Ouch, my back hurts\". This RN asked patient and FOB what was going on and if there was anything I could do to help them. Patient still crying and tearful verbalized her frustration with her call light not getting answered, explained what happened with her trying to get up to go to the bathroom and then stated that \"she fell and FOB was here to witness it.\" FOB stated that yes he saw her fall to the ground on her knees and helped her up. Patient stated that she fell when she got out of the bed because her IV was hurting and when she got up, it pulled on the IV tubing. This RN asked patient if she hurt anything when she fell and patient stated, \"No, it's just my stomach that hurts from cramping\". Patient then expressed her frustration with trying to breastfeed infant; this RN explained and educated patient and FOB on normal  behavior. Patient then mentioned to this RN that she is giving the infant up for adoption and that the adoptive parents were contacted and will be here tomorrow. This RN advised patient that she can " "continue to try to breastfeed infant or she can bottle feed since infant will be an adopt out, decision is up to the patient and FOB.  This RN offered to infant in the [facility] tonight so parents can get some rest, offered to do infant feeds. Parents decided they would like infant [facility] This RN asked patient and FOB if there was anything I could do to help them or make their stay better; patient stated \"No, things are okay I would just like my call lights to be answered.\" This RN advised patient and FOB to give staff about 30 seconds to answer call light. This RN placed her name and number on the board should patient and FOB have any other problems this evening. I asked the patient and FOB one last time prior to leaving the room if there was anything I could get them, patient stated, \"I need some water and ice packs\", patient also mentioned she would like some ice cream. This RN went and got patient some ice water, ice cream, ice packs, heat packs, tucks and spray. This RN then educated patient on how to use ice packs, tucks and spray and advised to press her call light if she needed help doing it. Patient verbalized understanding and thanked this RN for her help. Patient and FOB seemed in a slightly better mood when this RN left room.   6200 - This RN advised floor RN and CNA that if patient presses her call light, needs something and they are both unavailable to help patient at that moment, to call this RN.  Floor RN, CNA and this RN all in agreement.   "

## 2017-06-04 NOTE — CARE PLAN
Problem: Altered physiologic condition related to immediate post-delivery state and potential for bleeding/hemorrhage  Goal: Patient physiologically stable as evidenced by normal lochia, palpable uterine involution and vital signs within normal limits  Outcome: PROGRESSING AS EXPECTED  Patient using ice packs. Fundus is firm, lochia is light.    Problem: Alteration in comfort related to episiotomy, vaginal repair and/or after birth pains  Goal: Patient is able to ambulate, care for self and infant  Outcome: PROGRESSING AS EXPECTED  Patient is ambulatory in room

## 2017-06-04 NOTE — CARE PLAN
Problem: Discharge Barriers/Planning  Goal: Patient’s continuum of care needs will be met  DC home today    Problem: Pain Management  Goal: Pain level will decrease to patient’s comfort goal  Pt denies pain at this time

## 2017-06-04 NOTE — PROGRESS NOTES
Assessment completed. Patient apprehensive about assessment. Asked permission to assess fundus and bleeding. Fundus is firm, lochia is light. IV pitocin infusing at 125ml/hr. Discussed plan of care with patient. Encouraged to call for needs or concerns. At this time, she wants to have bath and hep B given to baby, but doesn't want to watch the bath. Will check with nursery when bath can happen this evening.

## 2017-06-04 NOTE — DISCHARGE SUMMARY
Discharge Summary:      Merissa Hyde      Admit Date:   2017  Discharge Date:  2017     Admitting diagnosis:  Pregnancy  Indication for care in labor or delivery  Discharge Diagnosis: Status post vaginal, spontaneous.  Pregnancy Complications: no prenatal care  Tubal Ligation:  no        History:  History reviewed. No pertinent past medical history.  OB History    Para Term  AB SAB TAB Ectopic Multiple Living   1               # Outcome Date GA Lbr Mahad/2nd Weight Sex Delivery Anes PTL Lv   1 Current               Obstetric Comments   Patient has not had prenatal care. LMP 2016 with EDC 2017. No US to verify dates         Review of patient's allergies indicates no known allergies.  Patient Active Problem List    Diagnosis Date Noted   • No prenatal care in current pregnancy 2017   • Vitamin D deficiency disease 2017   • Anemia 2017   • Pregnancy 2017   • Migraine without status migrainosus, not intractable 2017        Hospital Course:   21 y.o. , now para 1, was admitted with the above mentioned diagnosis, underwent Active Labor, vaginal, spontaneous. Patient postpartum course was unremarkable, with progressive advancement in diet , ambulation and toleration of oral analgesia. Patient without complaints today and desires discharge.      Filed Vitals:    17 1830 17 2000 17 0000 17 0400   BP: 101/51 112/76 106/63 109/74   Pulse: 71 68 78 78   Temp: 36 °C (96.8 °F) 36.4 °C (97.6 °F) 36.2 °C (97.2 °F) 36.3 °C (97.4 °F)   TempSrc:       Resp: 16 16 16 16   Height:       Weight:       SpO2: 98% 95% 97% 92%       Current Facility-Administered Medications   Medication Dose   • medroxyPROGESTERone (DEPO-PROVERA) injection 150 mg  150 mg   • oxytocin (PITOCIN) infusion (for postpartum)   mL/hr   • ibuprofen (MOTRIN) tablet 800 mg  800 mg   • oxycodone-acetaminophen (PERCOCET) 5-325 MG per tablet 1 Tab  1 Tab   •  oxycodone-acetaminophen (PERCOCET-10)  MG per tablet 1 Tab  1 Tab   • ondansetron (ZOFRAN) syringe/vial injection 4 mg  4 mg   • oxytocin (PITOCIN) 20 UNITS/1000ML LR (induction of labor)  0.5-20 jane-units/min       Exam:  Breast Exam: negative  Abdomen: Abdomen soft, non-tender. BS normal. No masses,  No organomegaly  Fundus Non Tender: yes  Incision: none  Perineum: 2nd degree tear  Extremity: extremities, peripheral pulses and reflexes normal     Labs:  Recent Labs      06/02/17   2259  06/04/17   0501   WBC  9.2  8.8   RBC  3.49*  3.15*   HEMOGLOBIN  11.3*  10.2*   HEMATOCRIT  32.7*  30.7*   MCV  93.7  97.5   MCH  32.4  32.4   MCHC  34.6  33.2*   RDW  43.5  46.0   PLATELETCT  198  154*   MPV  12.3  12.0        Activity:   Discharge to home  Pelvic Rest x 6 weeks    Assessment:  normal postpartum course  Discharge Assessment: No areas of skin breakdown/redness; surgical incision intact/healing     Follow up: .[facility]PC  in 5 weeks for vaginal     Discharge Meds:   Current Outpatient Prescriptions   Medication Sig Dispense Refill   • ibuprofen (MOTRIN) 800 MG Tab Take 1 Tab by mouth every 8 hours as needed (For cramping after delivery; do not give if patient is receiving ketorolac (Toradol)). 30 Tab 2       KAREN Sanchez

## 2017-06-04 NOTE — PROGRESS NOTES
1830 Received pt from L&D. Arrived via wheelchair. Transferred from wheelchair to bed w/out problems. Bands checked w/ infant. Oriented to room, bed, unit routine and call system.

## 2017-06-04 NOTE — DISCHARGE PLANNING
Discharge Planning Assessment Post Partum    Reason for Referral: Adoption  Address: [place] [address]  Type of Living Situation: lives with family  Mom Diagnosis: NA  Baby Diagnosis: NA  Primary Language: English    Prenatal Care: no prenatal care  Mom's PCP: Bethany Rose    Support System: Pt reports supportive boyfriend & family  Coping/Bonding between mother & baby: Adoption plan    Mom's Insurance: [employer]  Baby Covered on Insurance: Yes  Mother Employed/School: no  Other children in the home/names & ages: none    Financial Hardship/Income: no  Mom's Mental status: AOx4  Services used prior to admit: none    CPS History: No  Psychiatric History: No  Domestic Violence History: No  Drug/ETOH History: Pt reports marijuana use throughout pregnancy.    Resources Provided: none  Referrals Made: none     Clearance for Discharge: yes    Ongoing Plan: ANA met with pt at bedside.  Pt reports she would like to continue forward with adoption plan.  Pt to be d/c'd today, with plan for NB to be d/c'd tentatively tomorrow.  ANA met with [employer] worker, [place] [phone], who reports plan is for pt to be released to [facility] care.  Adoptive parents are at bedside as well and would like availability to see NB, pt is agreeable to plan.  SW copied adoptive father's ID, adoptive mother to bring her ID this afternoon as she does not have it at this time.  Pt signed [facility] release of medical records as well as Pt authorization form for d/c to [facility].  At this time no further needs for pt.

## 2017-06-04 NOTE — PROGRESS NOTES
"At 2230 I entered patients room to assist her to the nursery for babys bath. Upon entering patient was holding baby, when asked if she was ready, patient began crying. I asked if patient was ready and she responded by saying \" no I cant do this\"but I wonna be there for the baby's  shot.  Pateints boyfriend at bedside consoling patient.Patient still crying as I put baby in the crib. Pateint c/o uncontrollable pain to lower abdomen.Patient and boyfriend stated she had a fall. Boyfriend then stated it really wasn't a fall but rather \" I assisted her to her knees and then she laid on the floor, due to IV pain. Patient was assisted  back to bed by boyfriend.   Patient then stated she needed help getting out of bed.Assistance was given to patient to EOB. When patient stood up she stated she couldn't stand, but walked to wheel chair. Patient  and baby was assisted to nursery for babys bath.  "

## 2017-06-04 NOTE — PROGRESS NOTES
Vitals check done by JACQUIE Sotelo. Patient in bed, and her left arm assessed for any infiltration or pain. Patient states no pain with IV infusion. No infiltration noted. Patient noted leaking from breasts, and requested new gown.  Asked patient if would like more pain medication, she states yes, and pain is in her lower back. Medicated for complaints of pain. New gown given. No other patient needs at this time.

## 2017-06-26 ENCOUNTER — TELEPHONE (OUTPATIENT)
Dept: OBGYN | Facility: CLINIC | Age: 22
End: 2017-06-26

## 2017-10-02 ENCOUNTER — TELEPHONE (OUTPATIENT)
Dept: MEDICAL GROUP | Facility: LAB | Age: 22
End: 2017-10-02

## 2017-10-02 NOTE — TELEPHONE ENCOUNTER
This patient had an appt 9/27 and did not show up.  She needs to be rescheduled to discuss her post-partum depression - ok to use my 11 am tomorrow if she can make it.  thanks

## 2017-10-02 NOTE — TELEPHONE ENCOUNTER
Received call from ladonna and Jazz ([phone]) an .  Stated Merissa delivered her child on 6/2/17.  Since then she had heavy bleeding since 7/3/17, which since has stopped about two weeks ago.  Jazz also stated that Merissa has postpartum depression and would like a call tomorrow due to other appointments made today already discuss concerns she has..  Her new number is [phone].  Advise to seek medical care, ER or Urgent Care if any serious issue should arise until then.

## 2017-10-03 ENCOUNTER — TELEPHONE (OUTPATIENT)
Dept: MEDICAL GROUP | Facility: LAB | Age: 22
End: 2017-10-03

## 2017-10-05 ENCOUNTER — APPOINTMENT (OUTPATIENT)
Dept: MEDICAL GROUP | Facility: LAB | Age: 22
End: 2017-10-05
Payer: COMMERCIAL

## 2017-10-19 ENCOUNTER — TELEPHONE (OUTPATIENT)
Dept: MEDICAL GROUP | Facility: LAB | Age: 22
End: 2017-10-19

## 2017-10-19 NOTE — TELEPHONE ENCOUNTER
ESTABLISHED PATIENT PRE-VISIT PLANNING     Note: Patient will not be contacted if there is no indication to call.     1.  Reviewed notes from the last few office visits within the medical group: Yes    2.  If any orders were placed at last visit or intended to be done for this visit (i.e. 6 mos follow-up), do we have Results/Consult Notes?        •  Labs - Labs were not ordered at last office visit.   Note: If patient appointment is for lab review and patient did not complete labs,                check with provider if OK to reschedule patient until labs completed.       •  Imaging - Imaging was not ordered at last office visit.       •  Referrals - No referrals were ordered at last office visit.    3. Is this appointment scheduled as a Hospital Follow-Up? No    4.  Immunizations were updated in [place] using WebIZ?: Yes       •  Web Iz Recommendations: FLU, HPV, MENACTRA (MCV4), TDAP and VARICELLA (Chicken Pox)     5.  Patient is due for the following Health Maintenance Topics:   Health Maintenance Due   Topic Date Due   • IMM VARICELLA (CHICKENPOX) VACCINE (2 of 2 - 2 Dose Childhood Series) 09/18/2001   • IMM HPV VACCINE (1 of 3 - Female 3 Dose Series) 09/18/2008   • IMM DTaP/Tdap/Td Vaccine (6 - Tdap) 09/18/2008   • IMM MENINGOCOCCAL VACCINE (MCV4) (1 of 1) 09/18/2013   • IMM INFLUENZA (1) 09/01/2017       - Patient has completed HEPATITIS A  and HEPATITIS B Immunization(s) per WebIZ. Chart has been updated.      6.  Patient was NOT informed to arrive 15 min prior to their scheduled appointment and bring in their medication bottles.

## 2017-10-20 ENCOUNTER — TELEPHONE (OUTPATIENT)
Dept: MEDICAL GROUP | Facility: LAB | Age: 22
End: 2017-10-20
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
## 2017-10-24 ENCOUNTER — OFFICE VISIT (OUTPATIENT)
Dept: MEDICAL GROUP | Facility: LAB | Age: 22
End: 2017-10-24
Payer: COMMERCIAL

## 2017-10-24 VITALS
HEART RATE: 80 BPM | DIASTOLIC BLOOD PRESSURE: 78 MMHG | HEIGHT: 61 IN | SYSTOLIC BLOOD PRESSURE: 100 MMHG | TEMPERATURE: 98.5 F | WEIGHT: 106 LBS | BODY MASS INDEX: 20.01 KG/M2 | OXYGEN SATURATION: 96 % | RESPIRATION RATE: 12 BRPM

## 2017-10-24 DIAGNOSIS — Z23 NEED FOR IMMUNIZATION AGAINST INFLUENZA: ICD-10-CM

## 2017-10-24 PROCEDURE — 99214 OFFICE O/P EST MOD 30 MIN: CPT | Mod: 25 | Performed by: FAMILY MEDICINE

## 2017-10-24 PROCEDURE — 90471 IMMUNIZATION ADMIN: CPT | Performed by: FAMILY MEDICINE

## 2017-10-24 PROCEDURE — 90686 IIV4 VACC NO PRSV 0.5 ML IM: CPT | Performed by: FAMILY MEDICINE

## 2017-10-24 RX ORDER — FLUOXETINE HYDROCHLORIDE 20 MG/1
20 CAPSULE ORAL DAILY
Qty: 30 CAP | Refills: 1 | Status: SHIPPED | OUTPATIENT
Start: 2017-10-24 | End: 2017-12-21 | Stop reason: SDUPTHER

## 2017-10-24 NOTE — PROGRESS NOTES
Chief Complaint   Patient presents with   • Follow-Up     depression       HISTORY OF PRESENT ILLNESS: Patient is a 22 y.o. female established patient who presents today for above    Patient is concerned she has postpartum depression. This is a new problem to discuss today. She recently underwent an open adoption and gave birth to her daughter, Dillon, on Carol 3. Patient reports that she's had a lot of mood swings and she cannot sleep. She is more tearful than normal. She states that she just keeps crying and wants to sleep. She has no periods of very elevated mood. Reports that she is not acting out sexually and she is not spending her money carelessly.     She thinks also that a recent breakup has contributed to her change in mood.  She states that she started dating Johann a few months before her daughter was born. He lives in [place]. She states that her boyfriend went back to his ex girlfriend and this upset the patient. They are currently not talking. Reports that this happened in September. She states that she did self harm herself on her arm and also her leg. Denies any self-harm since.  She has no current suicidal ideation, no plan.. She states that she is missing some work because of this.     She reports a history of depression while she was in high school. She did take Prozac for a while and this did help. She doesn't remember the dose and why she stopped it.    She talked to a counselor (Tracy Pereyra) and only saw her for one appointment. She has an appointment tomorrow with a new counselor.      Again on Carol 3, 2017 she delivered her baby. States that she bled for about 3 months after she delivered her child.  She has an appointment soon for a gynecologic exam.     Patient Active Problem List    Diagnosis Date Noted   • No prenatal care in current pregnancy 05/04/2017   • Vitamin D deficiency disease 05/04/2017   • Anemia 05/04/2017   • Pregnancy 04/18/2017   • Migraine without status migrainosus,  "not intractable 04/18/2017        Allergies:Review of patient's allergies indicates no known allergies.    Current Outpatient Prescriptions   Medication Sig Dispense Refill   • ibuprofen (MOTRIN) 800 MG Tab Take 1 Tab by mouth every 8 hours as needed (For cramping after delivery; do not give if patient is receiving ketorolac (Toradol)). 30 Tab 2     No current facility-administered medications for this visit.        Social History   Substance Use Topics   • Smoking status: Former Smoker   • Smokeless tobacco: Not on file   • Alcohol use No       Social History     Social History Narrative   • No narrative on file       No family history on file.        Exam:    /78   Pulse 80   Temp 36.9 °C (98.5 °F)   Resp 12   Ht 1.549 m (5' 1\")   Wt 48.1 kg (106 lb)   SpO2 96%   BMI 20.03 kg/m²     General:  Well nourished, well developed female in NAD    Physical Exam   Constitutional:  Appears well-developed and well-nourished. Is active and cooperative.  Non-toxic appearance.   Head: Normocephalic and atraumatic.   Right Ear: External ear normal. Left Ear: External ear normal.   Eyes: Conjunctivae, EOM and lids are normal. No scleral icterus.   Skin: Skin is warm and dry. Patient is not diaphoretic.   Psychiatric: patient is tearful. Her eye contact is normal. She slightly tangential. Her speech is not pressured. Casually dressed.       Assessment/Plan:     1. Depression, postpartum  - fluoxetine (PROZAC) 20 MG Cap; Take 1 Cap by mouth every day.  Dispense: 30 Cap; Refill: 1    2. Need for immunization against influenza  - INFLUENZA VACCINE QUAD INJ >3Y(PF)    25 minutes spent with the patient, all of this time was spent in face-to-face counseling discussing treatment plan. Patient has postpartum depression. She also recently underwent a breakup. This is all been difficult for her. She does not regret giving up her daughter Dillon however this is very painful for her. She is agreeable to starting Prozac 20 mg " one by mouth daily. She has been on Prozac in the past and this did help her. She does not remember the dosage. Patient denies suicidal thoughts today. We did discuss plan of safety and we do have a contract of safety that if she has any thoughts of suicide or any suicidal ideation she is to go to the emergency room or call 911. Patient is to email me daily for the next week and is to follow up on Monday, October 30, 2017. Patient is in agreement with plan.    Please note that this dictation was created using voice recognition software. I have made every reasonable attempt to correct obvious errors, but I expect that there are errors of grammar and possibly content that I did not discover before finalizing the note.

## 2017-10-30 ENCOUNTER — OFFICE VISIT (OUTPATIENT)
Dept: MEDICAL GROUP | Facility: LAB | Age: 22
End: 2017-10-30
Payer: COMMERCIAL

## 2017-10-30 VITALS
HEART RATE: 84 BPM | HEIGHT: 61 IN | DIASTOLIC BLOOD PRESSURE: 60 MMHG | SYSTOLIC BLOOD PRESSURE: 90 MMHG | WEIGHT: 106 LBS | TEMPERATURE: 98.6 F | BODY MASS INDEX: 20.01 KG/M2 | OXYGEN SATURATION: 95 % | RESPIRATION RATE: 12 BRPM

## 2017-10-30 DIAGNOSIS — S90.852A FOREIGN BODY IN LEFT FOOT, INITIAL ENCOUNTER: ICD-10-CM

## 2017-10-30 PROBLEM — Z34.90 PREGNANCY: Status: RESOLVED | Noted: 2017-04-18 | Resolved: 2017-10-30

## 2017-10-30 PROBLEM — O09.30 NO PRENATAL CARE IN CURRENT PREGNANCY: Status: RESOLVED | Noted: 2017-05-04 | Resolved: 2017-10-30

## 2017-10-30 PROCEDURE — 99214 OFFICE O/P EST MOD 30 MIN: CPT | Performed by: FAMILY MEDICINE

## 2017-10-30 NOTE — PROGRESS NOTES
Chief Complaint   Patient presents with   • Follow-Up       HISTORY OF PRESENT ILLNESS: Patient is a 22 y.o. female established patient who presents today for follow up       Depression, postpartum  This is an ongoing problem. Patient Was seen last week on Tuesday and was started on Prozac 20 mg one by mouth daily. She is here for follow-up. States she hasn't noticed really any different starting medication. She went to a counseling appointment last week and she states that it went well. She has another appointment in 2 days, on November 1. She is remembering to take her medication. She is still having trouble sleeping. She states that she is up until about 1-2 AM. She is more tired during the day since she started the medication and she will try to take the medication before she goes to bed. She denies any suicidal ideation, no self harm. No periods of high emotion per patient     Patient is concerned that she has a foreign body in her left foot. This is a new problem to discuss today. States that she stepped on something when she was pregnant now still hurts and she is taking at it and nothing will come out. She wonders if it's a thorn or piece of glass.     Patient Active Problem List    Diagnosis Date Noted   • Depression, postpartum 10/24/2017   • Vitamin D deficiency disease 05/04/2017   • Anemia 05/04/2017   • Migraine without status migrainosus, not intractable 04/18/2017        Allergies:Review of patient's allergies indicates no known allergies.    Current Outpatient Prescriptions   Medication Sig Dispense Refill   • fluoxetine (PROZAC) 20 MG Cap Take 1 Cap by mouth every day. 30 Cap 1   • ibuprofen (MOTRIN) 800 MG Tab Take 1 Tab by mouth every 8 hours as needed (For cramping after delivery; do not give if patient is receiving ketorolac (Toradol)). 30 Tab 2     No current facility-administered medications for this visit.        Social History   Substance Use Topics   • Smoking status: Former Smoker   •  "Smokeless tobacco: Never Used   • Alcohol use No       Social History     Social History Narrative   • No narrative on file       No family history on file.    ROS:        Exam:    BP (!) 90/60   Pulse 84   Temp 37 °C (98.6 °F)   Resp 12   Ht 1.549 m (5' 1\")   Wt 48.1 kg (106 lb)   SpO2 95%   BMI 20.03 kg/m²     General:  Well nourished, well developed female in NAD    Physical Exam   Constitutional: Appears well-developed and well-nourished. Is active and cooperative.  Non-toxic appearance.   Head: Normocephalic and atraumatic.   Right Ear: External ear normal.  Left Ear: External ear normal.   Eyes: Conjunctivae, EOM and lids are normal. No scleral icterus.   Neurological: Alert. No tremor. No display of seizure activity. Coordination and gait normal.   Skin: Skin is warm and dry. Patient is not diaphoretic. left heel there is a sore from patient picking at her heel. No foreign body noted. Mild tenderness to palpation    Psychiatric: patient has a normal mood and affect; speech is normal and behavior is normal.        Assessment/Plan:    1. Depression, postpartum  Discussed with patient. She is to continue Prozac 20 mg one by mouth daily. She is to continue emailing me with updates on how she is doing. She is to see her therapist on Wednesday as scheduled. We have a verbal contract of safety that if she does have any suicidal ideation or thoughts of self-harm she is to go to the nearest hospital or call 911.    2. Foreign body in left foot, initial encounter  Discussed with patient. Recommend that she quit picking at her foot to look for foreign body and will refer her to podiatry. She can use OTC barrier to help keep pressure off the area until her appointment   - REFERRAL TO PODIATRY    F/u 2 weeks for f/u and gyn exam     Please note that this dictation was created using voice recognition software. I have made every reasonable attempt to correct obvious errors, but I expect that there are errors of " grammar and possibly content that I did not discover before finalizing the note.

## 2017-10-31 ENCOUNTER — APPOINTMENT (OUTPATIENT)
Dept: MEDICAL GROUP | Facility: LAB | Age: 22
End: 2017-10-31
Payer: COMMERCIAL

## 2017-11-10 ENCOUNTER — PATIENT MESSAGE (OUTPATIENT)
Dept: MEDICAL GROUP | Facility: LAB | Age: 22
End: 2017-11-10

## 2017-12-21 RX ORDER — FLUOXETINE HYDROCHLORIDE 20 MG/1
20 CAPSULE ORAL DAILY
Qty: 30 CAP | Refills: 1 | Status: SHIPPED | OUTPATIENT
Start: 2017-12-21 | End: 2018-06-15

## 2018-06-15 ENCOUNTER — OFFICE VISIT (OUTPATIENT)
Dept: MEDICAL GROUP | Facility: MEDICAL CENTER | Age: 23
End: 2018-06-15
Payer: COMMERCIAL

## 2018-06-15 VITALS
HEIGHT: 61 IN | SYSTOLIC BLOOD PRESSURE: 92 MMHG | DIASTOLIC BLOOD PRESSURE: 58 MMHG | OXYGEN SATURATION: 98 % | HEART RATE: 75 BPM | TEMPERATURE: 98.8 F | BODY MASS INDEX: 20.06 KG/M2 | WEIGHT: 106.26 LBS

## 2018-06-15 DIAGNOSIS — Z30.09 FAMILY PLANNING COUNSELING: ICD-10-CM

## 2018-06-15 DIAGNOSIS — D64.9 ANEMIA, UNSPECIFIED TYPE: ICD-10-CM

## 2018-06-15 DIAGNOSIS — L50.9 HIVES: ICD-10-CM

## 2018-06-15 DIAGNOSIS — Z76.89 ENCOUNTER TO ESTABLISH CARE: ICD-10-CM

## 2018-06-15 DIAGNOSIS — F33.1 MODERATE EPISODE OF RECURRENT MAJOR DEPRESSIVE DISORDER (HCC): ICD-10-CM

## 2018-06-15 DIAGNOSIS — J35.1 TONSILLAR HYPERTROPHY: ICD-10-CM

## 2018-06-15 DIAGNOSIS — H91.93 BILATERAL HEARING LOSS, UNSPECIFIED HEARING LOSS TYPE: ICD-10-CM

## 2018-06-15 PROBLEM — G43.909 MIGRAINE WITHOUT STATUS MIGRAINOSUS, NOT INTRACTABLE: Status: RESOLVED | Noted: 2017-04-18 | Resolved: 2018-06-15

## 2018-06-15 PROBLEM — T78.40XA ALLERGIC REACTION: Status: ACTIVE | Noted: 2018-06-15

## 2018-06-15 PROBLEM — T78.40XA ALLERGIC REACTION: Status: RESOLVED | Noted: 2018-06-15 | Resolved: 2018-06-15

## 2018-06-15 PROCEDURE — 99214 OFFICE O/P EST MOD 30 MIN: CPT | Performed by: PHYSICIAN ASSISTANT

## 2018-06-15 RX ORDER — CETIRIZINE HYDROCHLORIDE 10 MG/1
10 TABLET ORAL DAILY
Qty: 30 TAB | Refills: 11 | Status: SHIPPED | OUTPATIENT
Start: 2018-06-15 | End: 2021-05-26

## 2018-06-15 ASSESSMENT — PATIENT HEALTH QUESTIONNAIRE - PHQ9
CLINICAL INTERPRETATION OF PHQ2 SCORE: 4
5. POOR APPETITE OR OVEREATING: 1 - SEVERAL DAYS
SUM OF ALL RESPONSES TO PHQ QUESTIONS 1-9: 14

## 2018-06-15 NOTE — ASSESSMENT & PLAN NOTE
Also complains of bilateral hearing loss. Denies any recent trauma. Like to be evaluated and seen by ENT.

## 2018-06-15 NOTE — ASSESSMENT & PLAN NOTE
This is a 22-year-old female who is here today to establish care. Initially because of my discussions with Rola as well as my initial discussions with the patient she has a chronic history of depression. She denies any current history of homicidal or suicidal behavior. Has had suicidal behavior in the past. Has been on medications. Has seen psychiatry as well as behavioral help. Currently she is following no therapy. She is not taking any medications. Eventually I asked her if she was here today to discuss depression and she said no.

## 2018-06-15 NOTE — ASSESSMENT & PLAN NOTE
Has a chronic history of enlarged tonsils. Repeated throat infections. Also has history of nasal polyps. Wants to follow up with the ear nose and throat specialist.

## 2018-06-15 NOTE — ASSESSMENT & PLAN NOTE
She complains of an allergy to dogs. Currently lives in a home with 3 dogs. These are her aunts. She states that times if she gets in contact with the dog that her skin will burn and itch and she will scratch and developed hives. In the past she was given cetirizine which was effective. She takes it when needed. She is not interested in creams and doesn't want take Benadryl because it causes her to be drowsy.

## 2018-06-15 NOTE — ASSESSMENT & PLAN NOTE
She would like to be on birth control. She's been on other birth control measures in the past but would like something long-term. Would consider the IUD. She did have a full term delivery but gave her child up for adoption. She currently lives with her aunt. She is not currently in a stable relationship.

## 2018-06-15 NOTE — PROGRESS NOTES
Subjective:   Merissa Hyde is a 22 y.o. female here today for establishing care and to discuss hives and enlarged tonsils.    Moderate episode of recurrent major depressive disorder (HCC)  This is a 22-year-old female who is here today to establish care. Initially because of my discussions with Rola as well as my initial discussions with the patient she has a chronic history of depression. She denies any current history of homicidal or suicidal behavior. Has had suicidal behavior in the past. Has been on medications. Has seen psychiatry as well as behavioral help. Currently she is following no therapy. She is not taking any medications. Eventually I asked her if she was here today to discuss depression and she said no.    Family planning counseling  She would like to be on birth control. She's been on other birth control measures in the past but would like something long-term. Would consider the IUD. She did have a full term delivery but gave her child up for adoption. She currently lives with her aunt. She is not currently in a stable relationship.    Hives  She complains of an allergy to dogs. Currently lives in a home with 3 dogs. These are her aunts. She states that times if she gets in contact with the dog that her skin will burn and itch and she will scratch and developed hives. In the past she was given cetirizine which was effective. She takes it when needed. She is not interested in creams and doesn't want take Benadryl because it causes her to be drowsy.    Tonsillar hypertrophy  Has a chronic history of enlarged tonsils. Repeated throat infections. Also has history of nasal polyps. Wants to follow up with the ear nose and throat specialist.    Anemia  Has a history of anemia which has been unspecified. Not fully evaluated in the past.    Bilateral hearing loss  Also complains of bilateral hearing loss. Denies any recent trauma. Like to be evaluated and seen by ENT.       Current medicines  "(including changes today)  Current Outpatient Prescriptions   Medication Sig Dispense Refill   • cetirizine (ZYRTEC) 10 MG Tab Take 1 Tab by mouth every day. 30 Tab 11     No current facility-administered medications for this visit.      She  has no past medical history on file.    Social History and Family History were reviewed and updated.    ROS   No chest pain, no shortness of breath, no abdominal pain and all other systems were reviewed and are negative.       Objective:     Blood pressure (!) 92/58, pulse 75, temperature 37.1 °C (98.8 °F), height 1.549 m (5' 1\"), weight 48.2 kg (106 lb 4.2 oz), SpO2 98 %. Body mass index is 20.08 kg/m².   Physical Exam:  Constitutional: Alert, no distress.  Skin: Warm, dry, good turgor, no rashes in visible areas.  Eye: Equal, round and reactive, conjunctiva clear, lids normal.  ENMT: Lips without lesions, good dentition, oropharynx clear. Tonsils hypertrophic. Polyps noted bilateral nasal passages.  Neck: Trachea midline, no masses.   Lymph: No cervical or supraclavicular lymphadenopathy  Respiratory: Unlabored respiratory effort, lungs clear to auscultation, no wheezes, no ronchi.  Cardiovascular: Normal S1, S2, no murmur, no edema.  Psych: Alert and oriented x3, normal affect and mood.        Assessment and Plan:   The following treatment plan was discussed    1. Moderate episode of recurrent major depressive disorder (HCC)  Chronic condition. Uncontrolled. I offered referral to behavioral health and psychiatry which she declined. Advised that I could place her on another medication but she declined. She may contact me in my chart with any concerns or questions.  - Patient has been identified as being depressed and appropriate orders and counseling have been given    2. Hives  Chronic history is secondary to dog dander in contact. Continue to keep dogs out of her bedroom. Provided Zyrtec to take daily. Advise medication typically isn't used as a fast acting medication. " Would try over-the-counter Benadryl and take a liquid solution at 12.5 mg every 4 hours as needed.  - cetirizine (ZYRTEC) 10 MG Tab; Take 1 Tab by mouth every day.  Dispense: 30 Tab; Refill: 11    3. Family planning counseling  Referred to gynecology for discussion and implant of IUD.  - REFERRAL TO OB/GYN    4. Anemia, unspecified type  Chronic condition. Status unknown. Ordered labs to evaluate anemia.  - CBC WITH DIFFERENTIAL; Future  - IRON; Future  - FERRITIN; Future    5. Tonsillar hypertrophy  Chronic condition. Further to ENT for evaluation of the tonsils as well as her nasal polyps.  - REFERRAL TO ENT    6. Bilateral hearing loss, unspecified hearing loss type  Chronic condition. Referred to ENT for audiogram.  - REFERRAL TO ENT    7. Encounter to establish care      Followup: Return in about 3 months (around 9/15/2018), or if symptoms worsen or fail to improve.    Please note that this dictation was created using voice recognition software. I have made every reasonable attempt to correct obvious errors, but I expect that there are errors of grammar and possibly content that I did not discover before finalizing the note.

## 2019-03-02 ENCOUNTER — OFFICE VISIT (OUTPATIENT)
Dept: URGENT CARE | Facility: PHYSICIAN GROUP | Age: 24
End: 2019-03-02
Payer: COMMERCIAL

## 2019-03-02 VITALS
HEART RATE: 85 BPM | TEMPERATURE: 98.9 F | OXYGEN SATURATION: 95 % | BODY MASS INDEX: 21.86 KG/M2 | DIASTOLIC BLOOD PRESSURE: 62 MMHG | HEIGHT: 61 IN | SYSTOLIC BLOOD PRESSURE: 100 MMHG | WEIGHT: 115.8 LBS

## 2019-03-02 DIAGNOSIS — R68.89 FLU-LIKE SYMPTOMS: ICD-10-CM

## 2019-03-02 LAB
FLUAV+FLUBV AG SPEC QL IA: NEGATIVE
INT CON NEG: NEGATIVE
INT CON POS: POSITIVE

## 2019-03-02 PROCEDURE — 99214 OFFICE O/P EST MOD 30 MIN: CPT | Performed by: FAMILY MEDICINE

## 2019-03-02 PROCEDURE — 87804 INFLUENZA ASSAY W/OPTIC: CPT | Performed by: FAMILY MEDICINE

## 2019-03-02 RX ORDER — BENZONATATE 100 MG/1
100 CAPSULE ORAL 3 TIMES DAILY PRN
Qty: 60 CAP | Refills: 0 | Status: SHIPPED | OUTPATIENT
Start: 2019-03-02 | End: 2021-05-26

## 2019-03-02 ASSESSMENT — ENCOUNTER SYMPTOMS
DIARRHEA: 0
VOMITING: 0
SORE THROAT: 1
COUGH: 1
ABDOMINAL PAIN: 0
FEVER: 0
NAUSEA: 0
SHORTNESS OF BREATH: 0
HEADACHES: 1

## 2019-03-02 NOTE — PROGRESS NOTES
"Subjective:     Merissa Hyde is a 23 y.o. female who presents for Congestion (fever, sore throat, cough, x3 days )    HPI  Pt presents for evaluation of a new problem   Pt with illness for the past 3 days   Started as a cough   Now having more congestion, fevers, sore throat   Symptoms started suddenly and rapidly progressed   Cough is dry and nonproductive  Cough is worse at night  Sore throat is constant pain worse with coughing  Feeling very fatigued and with myalgias     Review of Systems   Constitutional: Negative for fever.   HENT: Positive for congestion, ear pain and sore throat.    Respiratory: Positive for cough. Negative for shortness of breath.    Cardiovascular: Negative for chest pain.   Gastrointestinal: Negative for abdominal pain, diarrhea, nausea and vomiting.   Skin: Negative for rash.   Neurological: Positive for headaches.     PMH: No history of asthma  MEDS:   Current Outpatient Prescriptions:   •  cetirizine (ZYRTEC) 10 MG Tab, Take 1 Tab by mouth every day., Disp: 30 Tab, Rfl: 11  ALLERGIES: No Known Allergies  SURGHX: History reviewed. No pertinent surgical history.  SOCHX:  reports that she has been smoking Cigarettes.  She has a 0.50 pack-year smoking history. She has never used smokeless tobacco. She reports that she drinks alcohol. She reports that she uses drugs, including Marijuana.  FH: Family history was reviewed, not contributing to acute illness     Objective:   /62 (BP Location: Right arm, Patient Position: Sitting, BP Cuff Size: Adult)   Pulse 85   Temp 37.2 °C (98.9 °F) (Temporal)   Ht 1.549 m (5' 1\")   Wt 52.5 kg (115 lb 12.8 oz)   SpO2 95%   BMI 21.88 kg/m²     Physical Exam   Constitutional: She appears well-developed and well-nourished. No distress.   HENT:   Head: Normocephalic and atraumatic.   Right Ear: Tympanic membrane, external ear and ear canal normal.   Left Ear: Tympanic membrane, external ear and ear canal normal.   Nose: Mucosal edema and " rhinorrhea present.   Mouth/Throat: Uvula is midline, oropharynx is clear and moist and mucous membranes are normal.   Eyes: Conjunctivae and EOM are normal. Right eye exhibits no discharge. Left eye exhibits no discharge. No scleral icterus.   Neck: Normal range of motion. No tracheal deviation present.   Cardiovascular: Normal rate and regular rhythm.    Pulmonary/Chest: Effort normal and breath sounds normal. No respiratory distress. She has no wheezes. She has no rales.   Neurological: She is alert. Coordination normal.   Skin: Skin is warm and dry. No rash noted. She is not diaphoretic.   Psychiatric: She has a normal mood and affect. Her behavior is normal. Judgment and thought content normal.     Assessment/Plan:   Assessment    1. Flu-like symptoms  Patient is a 23-year-old female with flulike illness.  Rapid influenza testing negative.  Advised supportive care measures including prescription cough medications, hydration, rest, and expect full resolution of symptoms within about 10 days.  Follow-up as needed.  - POCT Influenza A/B  - benzonatate (TESSALON) 100 MG Cap; Take 1 Cap by mouth 3 times a day as needed for Cough.  Dispense: 60 Cap; Refill: 0

## 2019-03-02 NOTE — LETTER
March 2, 2019    To Whom It May Concern:         This is confirmation that Merissa yHde attended her scheduled appointment with Nehemias Hopkins M.D. on 3/02/19.  Please excuse her from work.  She is anticipated to be able to return to work by 3/5/19.           If you have any questions please do not hesitate to call me at the phone number listed below.    Sincerely,          Nehemias Hopkins M.D.  [phone]

## 2019-04-03 ENCOUNTER — HOSPITAL ENCOUNTER (EMERGENCY)
Facility: MEDICAL CENTER | Age: 24
End: 2019-04-03
Attending: EMERGENCY MEDICINE
Payer: COMMERCIAL

## 2019-04-03 VITALS
HEART RATE: 64 BPM | TEMPERATURE: 97.9 F | WEIGHT: 106.7 LBS | SYSTOLIC BLOOD PRESSURE: 119 MMHG | HEIGHT: 61 IN | RESPIRATION RATE: 16 BRPM | DIASTOLIC BLOOD PRESSURE: 64 MMHG | BODY MASS INDEX: 20.15 KG/M2 | OXYGEN SATURATION: 100 %

## 2019-04-03 PROCEDURE — A9270 NON-COVERED ITEM OR SERVICE: HCPCS | Performed by: EMERGENCY MEDICINE

## 2019-04-03 PROCEDURE — 99284 EMERGENCY DEPT VISIT MOD MDM: CPT

## 2019-04-03 PROCEDURE — 700111 HCHG RX REV CODE 636 W/ 250 OVERRIDE (IP): Performed by: EMERGENCY MEDICINE

## 2019-04-03 PROCEDURE — 700102 HCHG RX REV CODE 250 W/ 637 OVERRIDE(OP): Performed by: EMERGENCY MEDICINE

## 2019-04-03 PROCEDURE — 96372 THER/PROPH/DIAG INJ SC/IM: CPT

## 2019-04-03 RX ORDER — DIPHENHYDRAMINE HCL 25 MG
25-50 TABLET ORAL EVERY 4 HOURS PRN
Status: DISCONTINUED | OUTPATIENT
Start: 2019-04-03 | End: 2019-04-03 | Stop reason: HOSPADM

## 2019-04-03 RX ORDER — HALOPERIDOL 5 MG/ML
5 INJECTION INTRAMUSCULAR ONCE
Status: COMPLETED | OUTPATIENT
Start: 2019-04-03 | End: 2019-04-03

## 2019-04-03 RX ORDER — HALOPERIDOL 5 MG/ML
INJECTION INTRAMUSCULAR
Status: DISCONTINUED
Start: 2019-04-03 | End: 2019-04-03 | Stop reason: HOSPADM

## 2019-04-03 RX ORDER — NICOTINE 21 MG/24HR
PATCH, TRANSDERMAL 24 HOURS TRANSDERMAL
Status: DISCONTINUED
Start: 2019-04-03 | End: 2019-04-03 | Stop reason: HOSPADM

## 2019-04-03 RX ORDER — NICOTINE 21 MG/24HR
1 PATCH, TRANSDERMAL 24 HOURS TRANSDERMAL ONCE
Status: COMPLETED | OUTPATIENT
Start: 2019-04-03 | End: 2019-04-03

## 2019-04-03 RX ORDER — DIPHENHYDRAMINE HCL 25 MG
TABLET ORAL
Status: DISCONTINUED
Start: 2019-04-03 | End: 2019-04-03 | Stop reason: HOSPADM

## 2019-04-03 RX ADMIN — HALOPERIDOL LACTATE 5 MG: 5 INJECTION, SOLUTION INTRAMUSCULAR at 03:00

## 2019-04-03 RX ADMIN — DIPHENHYDRAMINE HCL 25 MG: 25 TABLET ORAL at 04:13

## 2019-04-03 RX ADMIN — NICOTINE 1 PATCH: 21 PATCH, EXTENDED RELEASE TRANSDERMAL at 01:58

## 2019-04-03 NOTE — ED NOTES
Bedside report to [facility].  The pt d/c to the care of [facility].

## 2019-04-03 NOTE — ED NOTES
"The pt requested \"something to do\".  The pt was given colors ,  Coloring book, magazines, and x 2 books to choose from to read.   "

## 2019-04-03 NOTE — ED NOTES
Out to the Nrs station wanting to call her Aunt.  Finished the inst on the Legal 2000 process and asst the pt back to her room.

## 2019-04-03 NOTE — ED NOTES
"Pt out to the nurses' station \" I just feel like I want to run away \".   N.o. Rec'd. pt  tx w/ prn benkarinry.  The pt was given inst on stretching and walking around in the room.    After taking the benadryl, the pt began jumping and throwing self on the floor and banging head on the wall.  Security and charge nurse called to the room to talk with the pt.   "

## 2019-04-03 NOTE — ED NOTES
"The pt is whaling.  Not just tearful.  But whaling out loudly throughout the ED w/ the room window doors closed.   ERP updated.  The RN in to talk w/ the pt and inst on the Hadol order.  The pt stated \"you need to just quit talking to me,  you are being mean and I am suicidal\".  The injection was given and the pt was left to rest and calm down.  Also, the pt has spilled her cup of water on the table and the floor.  This was cleaned up by the RN.  "

## 2019-04-03 NOTE — ED TRIAGE NOTES
"Patient BiB coworkers from [facility] for SI and depression. Patient states \"I was on the line and I was crying because Im so sad and my managaer told me that I either needed to go to the hospital or quit crying because its making everyone uncomfortable.I just feel like it would be better if I wasn't here.\" Patient has hx of depression and self harm, but denies any previous attempts. Her mental health is managed by a therapist.   "

## 2019-04-03 NOTE — ED NOTES
Jace from [facility] called to accept the pt.  Accepting Physician Dr. Ramirez.  Report given to Jace.   arranging [facility] transport for the pt to occur at or about 0630 per Jace's request of tx time.

## 2019-04-03 NOTE — ED NOTES
Faxed legal hold, face sheet, Alert Team Assessment and ED encounter summary to the following facilities: [facility], [facility], [facility] and [facility].
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
## 2019-04-03 NOTE — ED NOTES
Despite verbal interventions, the pt continues to thrash around the bed and the room.  The pt was taken a hospital bed.

## 2020-04-10 ENCOUNTER — HOSPITAL ENCOUNTER (OUTPATIENT)
Facility: MEDICAL CENTER | Age: 25
End: 2020-04-10
Attending: PHYSICIAN ASSISTANT
Payer: COMMERCIAL

## 2020-04-10 ENCOUNTER — OFFICE VISIT (OUTPATIENT)
Dept: URGENT CARE | Facility: PHYSICIAN GROUP | Age: 25
End: 2020-04-10
Payer: COMMERCIAL

## 2020-04-10 VITALS
SYSTOLIC BLOOD PRESSURE: 108 MMHG | OXYGEN SATURATION: 100 % | RESPIRATION RATE: 14 BRPM | BODY MASS INDEX: 20.77 KG/M2 | HEART RATE: 83 BPM | DIASTOLIC BLOOD PRESSURE: 56 MMHG | TEMPERATURE: 99.7 F | WEIGHT: 110 LBS | HEIGHT: 61 IN

## 2020-04-10 DIAGNOSIS — R30.0 DYSURIA: ICD-10-CM

## 2020-04-10 DIAGNOSIS — N30.90 CYSTITIS: ICD-10-CM

## 2020-04-10 DIAGNOSIS — Z32.01 POSITIVE PREGNANCY TEST: ICD-10-CM

## 2020-04-10 DIAGNOSIS — Z32.01 POSITIVE PREGNANCY TEST: Primary | ICD-10-CM

## 2020-04-10 LAB
APPEARANCE UR: CLEAR
BILIRUB UR STRIP-MCNC: NEGATIVE MG/DL
COLOR UR AUTO: YELLOW
GLUCOSE UR STRIP.AUTO-MCNC: NEGATIVE MG/DL
INT CON NEG: NEGATIVE
INT CON POS: POSITIVE
KETONES UR STRIP.AUTO-MCNC: NEGATIVE MG/DL
LEUKOCYTE ESTERASE UR QL STRIP.AUTO: ABNORMAL
NITRITE UR QL STRIP.AUTO: NEGATIVE
PH UR STRIP.AUTO: 8.5 [PH] (ref 5–8)
POC URINE PREGNANCY TEST: POSITIVE
PROT UR QL STRIP: 100 MG/DL
RBC UR QL AUTO: ABNORMAL
SP GR UR STRIP.AUTO: 1.02
UROBILINOGEN UR STRIP-MCNC: 0.2 MG/DL

## 2020-04-10 PROCEDURE — 87186 SC STD MICRODIL/AGAR DIL: CPT

## 2020-04-10 PROCEDURE — 87077 CULTURE AEROBIC IDENTIFY: CPT

## 2020-04-10 PROCEDURE — 99214 OFFICE O/P EST MOD 30 MIN: CPT | Performed by: PHYSICIAN ASSISTANT

## 2020-04-10 PROCEDURE — 87086 URINE CULTURE/COLONY COUNT: CPT

## 2020-04-10 PROCEDURE — 87491 CHLMYD TRACH DNA AMP PROBE: CPT

## 2020-04-10 PROCEDURE — 87591 N.GONORRHOEAE DNA AMP PROB: CPT

## 2020-04-10 PROCEDURE — 81025 URINE PREGNANCY TEST: CPT | Performed by: PHYSICIAN ASSISTANT

## 2020-04-10 PROCEDURE — 81002 URINALYSIS NONAUTO W/O SCOPE: CPT | Performed by: PHYSICIAN ASSISTANT

## 2020-04-10 RX ORDER — CEFDINIR 300 MG/1
300 CAPSULE ORAL 2 TIMES DAILY
Qty: 10 CAP | Refills: 0 | Status: SHIPPED | OUTPATIENT
Start: 2020-04-10 | End: 2020-04-15

## 2020-04-10 ASSESSMENT — ENCOUNTER SYMPTOMS
FEVER: 0
CHILLS: 0
NAUSEA: 0
FLANK PAIN: 0

## 2020-04-11 ENCOUNTER — TELEPHONE (OUTPATIENT)
Dept: URGENT CARE | Facility: PHYSICIAN GROUP | Age: 25
End: 2020-04-11

## 2020-04-11 NOTE — PROGRESS NOTES
Subjective:      Tyesha Hyde is a 24 y.o. female who presents with Dysuria (x3days )    PMH:  has a past medical history of PTSD (post-traumatic stress disorder).  MEDS:   Current Outpatient Medications:   •  benzonatate (TESSALON) 100 MG Cap, Take 1 Cap by mouth 3 times a day as needed for Cough. (Patient not taking: Reported on 4/10/2020), Disp: 60 Cap, Rfl: 0  •  cetirizine (ZYRTEC) 10 MG Tab, Take 1 Tab by mouth every day. (Patient not taking: Reported on 4/10/2020), Disp: 30 Tab, Rfl: 11  ALLERGIES: No Known Allergies  SURGHX: No past surgical history on file.  SOCHX:  reports that she has been smoking cigarettes. She has a 0.50 pack-year smoking history. She has never used smokeless tobacco. She reports current alcohol use. She reports current drug use. Drugs: Marijuana and Inhaled.  FH: Reviewed with patient, not pertinent to this visit.           Patient presents with:  Dysuria: x3days , pain at end of urine stream.  Pt denies vaginal discharge, lesions or rash.      Pt is sexually active, does not have new partner but is aware that her partner has others.  Pt has irregular periods and does not use birth control.   LMP : March 18, 2020.      Dysuria    This is a new problem. Episode onset: 3 days. The problem occurs every urination. The problem has been gradually worsening. The quality of the pain is described as burning. The pain is at a severity of 7/10. There has been no fever. She is sexually active. There is no history of pyelonephritis. Associated symptoms include frequency and urgency. Pertinent negatives include no chills, discharge, flank pain, hematuria, hesitancy, nausea or possible pregnancy. She has tried increased fluids for the symptoms. The treatment provided no relief. There is no history of catheterization, kidney stones, recurrent UTIs, a single kidney, urinary stasis or a urological procedure.       Review of Systems   Constitutional: Negative for chills and fever.  "  Gastrointestinal: Negative for nausea.   Genitourinary: Positive for dysuria, frequency and urgency. Negative for flank pain, hematuria and hesitancy.   All other systems reviewed and are negative.         Objective:     /56 (BP Location: Left arm, Patient Position: Sitting, BP Cuff Size: Adult)   Pulse 83   Temp 37.6 °C (99.7 °F) (Temporal)   Resp 14   Ht 1.549 m (5' 1\")   Wt 49.9 kg (110 lb)   SpO2 100%   BMI 20.78 kg/m²      Physical Exam  Vitals signs and nursing note reviewed.   Constitutional:       General: She is not in acute distress.     Appearance: Normal appearance. She is well-developed and normal weight. She is not ill-appearing or toxic-appearing.   HENT:      Head: Normocephalic and atraumatic.      Nose: Nose normal.      Mouth/Throat:      Mouth: Mucous membranes are moist.   Eyes:      Extraocular Movements: Extraocular movements intact.      Conjunctiva/sclera: Conjunctivae normal.      Pupils: Pupils are equal, round, and reactive to light.   Neck:      Musculoskeletal: Normal range of motion and neck supple.   Cardiovascular:      Rate and Rhythm: Normal rate and regular rhythm.      Pulses: Normal pulses.      Heart sounds: Normal heart sounds.   Pulmonary:      Effort: Pulmonary effort is normal.      Breath sounds: Normal breath sounds.   Abdominal:      General: Bowel sounds are normal.      Palpations: Abdomen is soft.      Tenderness: There is no guarding or rebound.   Genitourinary:     Comments: Declined vaginal exam.  Musculoskeletal: Normal range of motion.   Skin:     General: Skin is warm and dry.      Capillary Refill: Capillary refill takes less than 2 seconds.   Neurological:      General: No focal deficit present.      Mental Status: She is alert and oriented to person, place, and time.      Gait: Gait normal.   Psychiatric:         Mood and Affect: Mood normal.         Behavior: Behavior is cooperative.       UA: + trace LE, trace blood  Preg: positive        "   Assessment/Plan:     1. Positive pregnancy test  cefdinir (OMNICEF) 300 MG Cap   2. Cystitis  Chlamydia/GC PCR Urine Or Swab    cefdinir (OMNICEF) 300 MG Cap   3. Dysuria  POCT Urinalysis    POCT PREGNANCY    Urinalysis, Culture if Indicated    Chlamydia/GC PCR Urine Or Swab    cefdinir (OMNICEF) 300 MG Cap     PT to begin medications today as prescribed.    Culture sent to lab, will call with any necessary treatment or treatment changes.     Pt did ask for GC test, will do with urine sample.     Pt offered information about pregnancy, declined.    PT should follow up with PCP in 1-2 days for re-evaluation if symptoms have not improved.  Discussed red flags and reasons to return to UC or ED.  Pt and/or family verbalized understanding of diagnosis and follow up instructions and was offered informational handout on diagnosis.  PT discharged.

## 2020-04-14 LAB
BACTERIA UR CULT: ABNORMAL
BACTERIA UR CULT: ABNORMAL
SIGNIFICANT IND 70042: ABNORMAL
SITE SITE: ABNORMAL
SOURCE SOURCE: ABNORMAL

## 2020-11-05 ENCOUNTER — HOSPITAL ENCOUNTER (OUTPATIENT)
Facility: MEDICAL CENTER | Age: 25
End: 2020-11-05
Attending: PHYSICIAN ASSISTANT
Payer: COMMERCIAL

## 2020-11-05 ENCOUNTER — OFFICE VISIT (OUTPATIENT)
Dept: URGENT CARE | Facility: PHYSICIAN GROUP | Age: 25
End: 2020-11-05
Payer: COMMERCIAL

## 2020-11-05 VITALS
DIASTOLIC BLOOD PRESSURE: 60 MMHG | RESPIRATION RATE: 18 BRPM | HEART RATE: 90 BPM | BODY MASS INDEX: 19.45 KG/M2 | OXYGEN SATURATION: 98 % | WEIGHT: 103 LBS | TEMPERATURE: 98 F | SYSTOLIC BLOOD PRESSURE: 100 MMHG | HEIGHT: 61 IN

## 2020-11-05 DIAGNOSIS — J02.9 PHARYNGITIS, UNSPECIFIED ETIOLOGY: ICD-10-CM

## 2020-11-05 DIAGNOSIS — R52 BODY ACHES: ICD-10-CM

## 2020-11-05 DIAGNOSIS — R05.9 COUGH: ICD-10-CM

## 2020-11-05 DIAGNOSIS — J35.1 TONSILLAR ENLARGEMENT: ICD-10-CM

## 2020-11-05 LAB
INT CON NEG: NORMAL
INT CON POS: NORMAL
S PYO AG THROAT QL: NORMAL

## 2020-11-05 PROCEDURE — 99214 OFFICE O/P EST MOD 30 MIN: CPT | Performed by: PHYSICIAN ASSISTANT

## 2020-11-05 PROCEDURE — 87880 STREP A ASSAY W/OPTIC: CPT | Performed by: PHYSICIAN ASSISTANT

## 2020-11-05 PROCEDURE — U0003 INFECTIOUS AGENT DETECTION BY NUCLEIC ACID (DNA OR RNA); SEVERE ACUTE RESPIRATORY SYNDROME CORONAVIRUS 2 (SARS-COV-2) (CORONAVIRUS DISEASE [COVID-19]), AMPLIFIED PROBE TECHNIQUE, MAKING USE OF HIGH THROUGHPUT TECHNOLOGIES AS DESCRIBED BY CMS-2020-01-R: HCPCS

## 2020-11-05 RX ORDER — BENZONATATE 100 MG/1
100 CAPSULE ORAL 3 TIMES DAILY PRN
Qty: 60 CAP | Refills: 0 | Status: SHIPPED | OUTPATIENT
Start: 2020-11-05 | End: 2021-05-26

## 2020-11-05 ASSESSMENT — ENCOUNTER SYMPTOMS
SHORTNESS OF BREATH: 0
SORE THROAT: 0
DIAPHORESIS: 0
FEVER: 0
NAUSEA: 0
STRIDOR: 0
DIZZINESS: 0
PALPITATIONS: 0
SINUS PAIN: 0
WHEEZING: 0
MYALGIAS: 1
COUGH: 1
ABDOMINAL PAIN: 0
CHILLS: 0
SPUTUM PRODUCTION: 0
VOMITING: 0
DIARRHEA: 0
EYE PAIN: 0
HEADACHES: 0

## 2020-11-05 NOTE — PROGRESS NOTES
Subjective:   Merissa Hyde is a 25 y.o. female who presents for Cough (cough, body aches, green mucus. started yesterday )      HPI:  This is a very pleasant 25-year-old female presenting to the clinic with cough, congestion and body aches starting yesterday.  Currently the patient states her symptoms are fairly mild.  Her cough is intermittent and nonproductive.  Congestion with occasional green rhinorrhea.  Denies any shortness of breath or chest pain.  Has not had any fevers or chills.  Had mild body aches yesterday that have since resolved.  She has no known ill contacts.  Has not been taking any OTC medications for symptoms.  Denies any past medical history significant for asthma or allergies.    Review of Systems   Constitutional: Negative for chills, diaphoresis, fever and malaise/fatigue.   HENT: Positive for congestion. Negative for ear pain, sinus pain and sore throat.    Eyes: Negative for pain.   Respiratory: Positive for cough. Negative for sputum production, shortness of breath, wheezing and stridor.    Cardiovascular: Negative for chest pain and palpitations.   Gastrointestinal: Negative for abdominal pain, diarrhea, nausea and vomiting.   Musculoskeletal: Positive for myalgias.   Neurological: Negative for dizziness and headaches.   Endo/Heme/Allergies: Negative for environmental allergies.       Medications:    • benzonatate Caps  • cetirizine Tabs    Allergies: Patient has no known allergies.    Problem List: Merissa Hyde has Vitamin D deficiency disease; Anemia; Moderate episode of recurrent major depressive disorder (HCC); Hives; Family planning counseling; Tonsillar hypertrophy; and Bilateral hearing loss on their problem list.    Surgical History:  No past surgical history on file.    Past Social Hx: Merissa Hyde  reports that she has been smoking cigarettes. She has a 0.50 pack-year smoking history. She has never used smokeless tobacco. She reports current alcohol  "use. She reports current drug use. Drugs: Marijuana and Inhaled.     Past Family Hx:  Merissa Hyde family history includes No Known Problems in her father.     Problem list, medications, and allergies reviewed by myself today in Epic.     Objective:     /60 (BP Location: Left arm, Patient Position: Sitting, BP Cuff Size: Adult)   Pulse 90   Temp 36.7 °C (98 °F) (Temporal)   Resp 18   Ht 1.549 m (5' 1\")   Wt 46.7 kg (103 lb)   SpO2 98%   BMI 19.46 kg/m²     Physical Exam  Constitutional:       General: She is not in acute distress.     Appearance: Normal appearance. She is not ill-appearing, toxic-appearing or diaphoretic.   HENT:      Head: Normocephalic and atraumatic.      Right Ear: Tympanic membrane, ear canal and external ear normal.      Left Ear: Tympanic membrane, ear canal and external ear normal.      Nose: Congestion present. No rhinorrhea.      Mouth/Throat:      Mouth: Mucous membranes are moist.      Pharynx: Posterior oropharyngeal erythema present. No oropharyngeal exudate.      Comments: Posterior oropharynx erythematous.  3+ tonsillar edema bilaterally.  No tonsillar exudate.  Uvula midline nondeviated.  Eyes:      Conjunctiva/sclera: Conjunctivae normal.   Neck:      Musculoskeletal: Normal range of motion. No neck rigidity or muscular tenderness.   Cardiovascular:      Rate and Rhythm: Normal rate and regular rhythm.      Pulses: Normal pulses.      Heart sounds: Normal heart sounds.   Pulmonary:      Effort: Pulmonary effort is normal.      Breath sounds: Normal breath sounds. No wheezing, rhonchi or rales.   Abdominal:      Palpations: Abdomen is soft.      Tenderness: There is no abdominal tenderness.   Lymphadenopathy:      Cervical: No cervical adenopathy.   Skin:     General: Skin is warm and dry.      Capillary Refill: Capillary refill takes less than 2 seconds.   Neurological:      General: No focal deficit present.      Mental Status: She is alert and oriented to " person, place, and time. Mental status is at baseline.   Psychiatric:         Mood and Affect: Mood normal.         Thought Content: Thought content normal.       Rapid strep: Negative    Assessment/Plan:     Diagnosis and associated orders:     1. Pharyngitis, unspecified etiology  POCT Rapid Strep A    COVID/SARS COV-2 PCR   2. Cough  COVID/SARS COV-2 PCR    benzonatate (TESSALON) 100 MG Cap   3. Body aches  POCT Rapid Strep A    COVID/SARS COV-2 PCR   4. Tonsillar enlargement  POCT Rapid Strep A    COVID/SARS COV-2 PCR      Comments/MDM:     Recommended strict isolation precautions.  Stay isolated until I reach out with final results.  Results may take 2 to 3 days.  Recommended supportive treatment including increase fluids and rest.  Use Tylenol and ibuprofen as needed for pain and fever.  Red flag symptoms were discussed with the patient at length today.  If any of these symptoms present return to the clinic or nearest emergency department.  Please call with any questions or concerns.           Differential diagnosis, natural history, supportive care, and indications for immediate follow-up discussed.    Advised the patient to follow-up with the primary care physician for recheck, reevaluation, and consideration of further management.    Please note that this dictation was created using voice recognition software. I have made reasonable attempt to correct obvious errors, but I expect that there are errors of grammar and possibly content that I did not discover before finalizing the note.    This note was electronically signed by EVE Shultz PA-C

## 2020-11-06 LAB
COVID ORDER STATUS COVID19: NORMAL
SARS-COV-2 RNA RESP QL NAA+PROBE: NOTDETECTED
SPECIMEN SOURCE: NORMAL

## 2020-12-08 ENCOUNTER — SUPERVISING PHYSICIAN REVIEW (OUTPATIENT)
Dept: URGENT CARE | Facility: CLINIC | Age: 25
End: 2020-12-08

## 2020-12-08 NOTE — PROGRESS NOTES
I have reviewed and agree with history, assessment and plan for office encounter on 11/5/2020 with Advanced Practice Provider: Jose Shultz  PAC.  Face to face encounter/direct observation: No  Suggested changes to plan or follow-up: none   Francisco Kendall M.D.

## 2021-03-17 ENCOUNTER — HOSPITAL ENCOUNTER (EMERGENCY)
Facility: MEDICAL CENTER | Age: 26
End: 2021-03-17
Attending: EMERGENCY MEDICINE
Payer: COMMERCIAL

## 2021-03-17 VITALS
WEIGHT: 113.1 LBS | SYSTOLIC BLOOD PRESSURE: 110 MMHG | RESPIRATION RATE: 18 BRPM | DIASTOLIC BLOOD PRESSURE: 69 MMHG | HEART RATE: 60 BPM | OXYGEN SATURATION: 100 % | BODY MASS INDEX: 21.35 KG/M2 | HEIGHT: 61 IN | TEMPERATURE: 99 F
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
## 2021-03-17 DIAGNOSIS — S81.852A DOG BITE OF LEFT LOWER LEG, INITIAL ENCOUNTER: ICD-10-CM

## 2021-03-17 PROCEDURE — 90471 IMMUNIZATION ADMIN: CPT | Mod: EDC

## 2021-03-17 PROCEDURE — 304217 HCHG IRRIGATION SYSTEM: Mod: EDC

## 2021-03-17 PROCEDURE — 99283 EMERGENCY DEPT VISIT LOW MDM: CPT | Mod: EDC

## 2021-03-17 PROCEDURE — 700102 HCHG RX REV CODE 250 W/ 637 OVERRIDE(OP): Performed by: EMERGENCY MEDICINE

## 2021-03-17 PROCEDURE — 90715 TDAP VACCINE 7 YRS/> IM: CPT | Performed by: EMERGENCY MEDICINE

## 2021-03-17 PROCEDURE — 700111 HCHG RX REV CODE 636 W/ 250 OVERRIDE (IP): Performed by: EMERGENCY MEDICINE

## 2021-03-17 PROCEDURE — A9270 NON-COVERED ITEM OR SERVICE: HCPCS | Performed by: EMERGENCY MEDICINE

## 2021-03-17 RX ORDER — AMOXICILLIN AND CLAVULANATE POTASSIUM 875; 125 MG/1; MG/1
1 TABLET, FILM COATED ORAL 2 TIMES DAILY
Qty: 10 TABLET | Refills: 0 | Status: SHIPPED | OUTPATIENT
Start: 2021-03-17 | End: 2021-03-22

## 2021-03-17 RX ORDER — AMOXICILLIN AND CLAVULANATE POTASSIUM 875; 125 MG/1; MG/1
1 TABLET, FILM COATED ORAL ONCE
Status: COMPLETED | OUTPATIENT
Start: 2021-03-17 | End: 2021-03-17

## 2021-03-17 RX ADMIN — CLOSTRIDIUM TETANI TOXOID ANTIGEN (FORMALDEHYDE INACTIVATED), CORYNEBACTERIUM DIPHTHERIAE TOXOID ANTIGEN (FORMALDEHYDE INACTIVATED), BORDETELLA PERTUSSIS TOXOID ANTIGEN (GLUTARALDEHYDE INACTIVATED), BORDETELLA PERTUSSIS FILAMENTOUS HEMAGGLUTININ ANTIGEN (FORMALDEHYDE INACTIVATED), BORDETELLA PERTUSSIS PERTACTIN ANTIGEN, AND BORDETELLA PERTUSSIS FIMBRIAE 2/3 ANTIGEN 0.5 ML: 5; 2; 2.5; 5; 3; 5 INJECTION, SUSPENSION INTRAMUSCULAR at 19:21

## 2021-03-17 RX ADMIN — AMOXICILLIN AND CLAVULANATE POTASSIUM 1 TABLET: 875; 125 TABLET, FILM COATED ORAL at 19:21

## 2021-03-18 NOTE — ED NOTES
Pt walked to peds 53. Pt placed in gown. POC explained. Call light within reach. Denies needs at this time. Will continue to monitor.     Dog bite form provided to pt.

## 2021-03-18 NOTE — ED PROVIDER NOTES
"ED Provider Note    Scribed for Kirill Copeland M.D. by Josue Cortez-Reyes. 3/17/2021, 6:40 PM.    Primary care provider: Gigi Nuñez P.A.-C.  Means of arrival: Walk-in  History obtained from: Patient  History limited by: None    CHIEF COMPLAINT  Chief Complaint   Patient presents with    Dog Bite     pt was bitten on left lateral calf by unknown dog last night around midnight; pt called Animal Control as dog's vaccination status is unknown       HPI  Merissa Hyde is a 25 y.o. female who presents to the Emergency Department for evaluation following a dog bite which occurred yesterday around midnight. The patient states that her vaccinations are not up to date and she is unsure if the dog was vaccinated. She states that she washed the bite on her lower left leg with iodine. The patient denies any additional bite, nausea, fever, vomiting, cough or diarrhea. She also denies any recent exposure to COVID.    REVIEW OF SYSTEMS  Pertinent positives include laceration to lower left leg. Pertinent negatives include nausea, fever, vomiting, cough or diarrhea.    PAST MEDICAL HISTORY   has a past medical history of Borderline personality disorder (HCC) and PTSD (post-traumatic stress disorder).    SURGICAL HISTORY  patient denies any surgical history    SOCIAL HISTORY  Social History     Tobacco Use    Smoking status: Current Every Day Smoker     Packs/day: 0.25     Years: 2.00     Pack years: 0.50     Types: Cigarettes    Smokeless tobacco: Never Used    Tobacco comment: 3 cigs daily   Substance Use Topics    Alcohol use: Yes     Comment: Occasionally: \"once or twice a week\"    Drug use: Yes     Types: Marijuana, Inhaled     Comment: marijuana      Social History     Substance and Sexual Activity   Drug Use Yes    Types: Marijuana, Inhaled    Comment: marijuana       FAMILY HISTORY  Family History   Problem Relation Age of Onset    No Known Problems Father        CURRENT MEDICATIONS  Current Outpatient " "Medications   Medication Instructions    amoxicillin-clavulanate (AUGMENTIN) 875-125 MG Tab 1 tablet, Oral, 2 TIMES DAILY    benzonatate (TESSALON) 100 mg, Oral, 3 TIMES DAILY PRN    benzonatate (TESSALON) 100 mg, Oral, 3 TIMES DAILY PRN    cetirizine (ZYRTEC) 10 mg, Oral, DAILY     ALLERGIES  No Known Allergies    PHYSICAL EXAM  VITAL SIGNS: /76   Pulse 68   Temp 36.6 °C (97.9 °F) (Temporal)   Resp 16   Ht 1.549 m (5' 1\")   Wt 51.3 kg (113 lb 1.5 oz)   LMP 03/03/2021 (Exact Date)   SpO2 99%   BMI 21.37 kg/m²     Constitutional: Well developed, Well nourished, No distress.   Cardiovascular: Normal heart rate, Normal rhythm, No murmurs, equal pulses.   Pulmonary: Normal breath sounds, No respiratory distress, No wheezing, No rales, No rhonchi.  Musculoskeletal: No major deformities noted, No tenderness. Superficial abrasions and bruising to lower lateral aspect of left calf, no bony tenderness, No tenderness in ankle or knee.  Skin: Warm, Dry, No erythema, No rash.   Neurologic: Alert & oriented x 3, Normal motor function,  No focal deficits noted.     COURSE & MEDICAL DECISION MAKING  Pertinent Labs & Imaging studies reviewed. (See chart for details)    6:40 PM - Patient seen and examined at bedside. Patient will be treated with Adacel, and Augmrentin. I discussed risk of rabies with the patient including the risk for paralysis and muscle spasms. Patient chooses to defer rabies vaccination at this time as she will follow up with animal control to confirm whether or not the dog was vaccinated. The patient informs me that she would like to have her tetanus vaccinations updated.    6:53 PM - I discussed plan for discharge and follow up as outlined below. The patient verbalizes they feel comfortable going home. The patient is stable for discharge at this time and will return for any new or worsening symptoms. Patient verbalizes understanding and support with my plan for discharge.     Medical Decision " Making: Patient presents with a dog bite from last night.  This was a superficial abrasion but did break the skin.  There is some mild bruising but no deep wounds.  Patient will be started on prophylactic Augmentin.  She will be given tetanus shot.  I did discuss rabies with the patient that at this point time she is declining this and she will follow up with animal control.  Patient was given strict return guidelines for signs of infection.    The patient will return for new or worsening symptoms and is stable at the time of discharge.    DISPOSITION:  Patient will be discharged home in stable condition.    FOLLOW UP:  Gigi Nuñez P.A.-C.  [address]  [address]  [facility] [address]  [phone]    Schedule an appointment as soon as possible for a visit in 1 week  For wound re-check      OUTPATIENT MEDICATIONS:  Discharge Medication List as of 3/17/2021  7:32 PM        START taking these medications    Details   amoxicillin-clavulanate (AUGMENTIN) 875-125 MG Tab Take 1 tablet by mouth 2 times a day for 5 days., Disp-10 tablet, R-0, Normal           FINAL IMPRESSION  1. Dog bite of left lower leg, initial encounter        I, Josue Cortez-Reyes (Elio), am scribing for, and in the presence of, Kirill Copeland M.D.    Electronically signed by: Josue Cortez-Reyes (Elio), 3/17/2021    IKirill M.D. personally performed the services described in this documentation, as scribed by Josue Cortez-Reyes in my presence, and it is both accurate and complete.E.  The note accurately reflects work and decisions made by me.  Kirill Copeland M.D.  3/17/2021  10:22 PM

## 2021-03-18 NOTE — ED TRIAGE NOTES
Chief Complaint   Patient presents with   • Dog Bite     pt was bitten on left lateral calf by unknown dog last night around midnight; pt called Animal Control as dog's vaccination status is unknown     Pt ambulatory to triage with roommate for above complaint. Puncture marks noted to calf with bandaid in place, bleeding is controlled.     Pt is alert/oriented and follows commands. Pt speaking in full sentences and responds appropriately to questions. No acute distress noted in triage and respirations are even and unlabored.     Pt placed in lobby and educated on triage process. Pt encouraged to alert staff for any changes in condition.

## 2021-03-18 NOTE — DISCHARGE INSTRUCTIONS
Return if you have increasing pain, redness, fever or pus. Wash wound daily with soap and water, apply antibiotic ointment and keep covered.

## 2021-03-18 NOTE — ED NOTES
Pt NAD, resting on cot, no reactions noted from IM injection. Pt d/c home with 1RX - Augmentin, meds, wound care and follow up care discussed, pt verbalized understanding.

## 2021-04-13 ENCOUNTER — IMMUNIZATION (OUTPATIENT)
Dept: FAMILY PLANNING/WOMEN'S HEALTH CLINIC | Facility: IMMUNIZATION CENTER | Age: 26
End: 2021-04-13
Payer: COMMERCIAL

## 2021-04-13 DIAGNOSIS — Z23 ENCOUNTER FOR VACCINATION: Primary | ICD-10-CM

## 2021-04-13 PROCEDURE — 0001A PFIZER SARS-COV-2 VACCINE: CPT | Performed by: INTERNAL MEDICINE

## 2021-04-13 PROCEDURE — 91300 PFIZER SARS-COV-2 VACCINE: CPT | Performed by: INTERNAL MEDICINE

## 2021-05-06 ENCOUNTER — IMMUNIZATION (OUTPATIENT)
Dept: FAMILY PLANNING/WOMEN'S HEALTH CLINIC | Facility: IMMUNIZATION CENTER | Age: 26
End: 2021-05-06
Payer: COMMERCIAL

## 2021-05-06 DIAGNOSIS — Z23 ENCOUNTER FOR VACCINATION: Primary | ICD-10-CM

## 2021-05-06 PROCEDURE — 0002A PFIZER SARS-COV-2 VACCINE: CPT

## 2021-05-06 PROCEDURE — 91300 PFIZER SARS-COV-2 VACCINE: CPT

## 2021-05-26 ENCOUNTER — OFFICE VISIT (OUTPATIENT)
Dept: URGENT CARE | Facility: PHYSICIAN GROUP | Age: 26
End: 2021-05-26
Payer: COMMERCIAL

## 2021-05-26 VITALS
SYSTOLIC BLOOD PRESSURE: 106 MMHG | OXYGEN SATURATION: 99 % | BODY MASS INDEX: 20.01 KG/M2 | HEART RATE: 75 BPM | TEMPERATURE: 98.5 F | WEIGHT: 106 LBS | RESPIRATION RATE: 16 BRPM | DIASTOLIC BLOOD PRESSURE: 78 MMHG | HEIGHT: 61 IN

## 2021-05-26 DIAGNOSIS — N92.6 MISSED MENSES: ICD-10-CM

## 2021-05-26 DIAGNOSIS — Z97.5 IUD (INTRAUTERINE DEVICE) IN PLACE: Primary | ICD-10-CM

## 2021-05-26 DIAGNOSIS — N64.52 NIPPLE DISCHARGE: ICD-10-CM

## 2021-05-26 LAB
INT CON NEG: NORMAL
INT CON POS: NORMAL
POC URINE PREGNANCY TEST: NEGATIVE

## 2021-05-26 PROCEDURE — 81025 URINE PREGNANCY TEST: CPT | Performed by: PHYSICIAN ASSISTANT

## 2021-05-26 PROCEDURE — 99213 OFFICE O/P EST LOW 20 MIN: CPT | Performed by: PHYSICIAN ASSISTANT

## 2021-05-27 NOTE — PROGRESS NOTES
"Subjective:   Merissa Hyde is a 25 y.o. female who presents for Other (might be pregnant, on birthcontrol, emotional )        HPI   The patient presents for a pregnancy test.  She is concerned of pregnancy due to missed menses and nipple discharge.  Her last menstrual cycle was March 22.  She did have a Mirena IUD implanted in February for the first time.  She has not had a normal period since IUD was placed.  The nipple discharge occurred last night described as clear-whitish discharge.  No pain, fever, chills.  There is no change in vaginal discharge.        ROS    PMH:  has a past medical history of Borderline personality disorder (HCC) and PTSD (post-traumatic stress disorder).  MEDS: No current outpatient medications on file.  ALLERGIES: No Known Allergies  SURGHX: History reviewed. No pertinent surgical history.  SOCHX:  reports that she has been smoking cigarettes. She has a 0.50 pack-year smoking history. She has never used smokeless tobacco. She reports current alcohol use. She reports current drug use. Drugs: Marijuana and Inhaled.  Family History   Problem Relation Age of Onset   • No Known Problems Father         Objective:   /78 (BP Location: Right arm, Patient Position: Sitting, BP Cuff Size: Adult)   Pulse 75   Temp 36.9 °C (98.5 °F) (Temporal)   Resp 16   Ht 1.549 m (5' 1\")   Wt 48.1 kg (106 lb)   SpO2 99%   BMI 20.03 kg/m²     Physical Exam  Vitals reviewed.   Constitutional:       General: She is not in acute distress.     Appearance: She is well-developed.   HENT:      Head: Normocephalic and atraumatic.   Neck:      Trachea: No tracheal deviation.   Pulmonary:      Effort: Pulmonary effort is normal.   Chest:      Comments: No nipple discharge expressed on exam.  No tenderness, skin changes, erythema or swelling.  No TTP.  Skin:     General: Skin is warm and dry.      Capillary Refill: Capillary refill takes less than 2 seconds.   Neurological:      Mental Status: She is " alert and oriented to person, place, and time.   Psychiatric:         Mood and Affect: Mood normal.         Behavior: Behavior normal.           Assessment/Plan:     1. IUD (intrauterine device) in place     2. Missed menses  POCT Pregnancy   3. Nipple discharge       hCG: Negative    Supportive care reviewed.  Continue to monitor symptoms closely.  She will follow-up as scheduled with her primary care provider on Tuesday.    If symptoms worsen or persist patient can return to clinic for reevaluation. Patient confirmed understanding of information.    Please note that this dictation was created using voice recognition software. I have made every reasonable attempt to correct obvious errors, but I expect that there are errors of grammar and possibly content that I did not discover before finalizing the note.

## 2021-06-11 ENCOUNTER — OFFICE VISIT (OUTPATIENT)
Dept: URGENT CARE | Facility: PHYSICIAN GROUP | Age: 26
End: 2021-06-11
Payer: COMMERCIAL

## 2021-06-11 ENCOUNTER — HOSPITAL ENCOUNTER (OUTPATIENT)
Facility: MEDICAL CENTER | Age: 26
End: 2021-06-11
Attending: PHYSICIAN ASSISTANT
Payer: COMMERCIAL

## 2021-06-11 VITALS
OXYGEN SATURATION: 100 % | SYSTOLIC BLOOD PRESSURE: 120 MMHG | RESPIRATION RATE: 18 BRPM | TEMPERATURE: 97.9 F | HEIGHT: 61 IN | DIASTOLIC BLOOD PRESSURE: 80 MMHG | WEIGHT: 100 LBS | HEART RATE: 82 BPM | BODY MASS INDEX: 18.88 KG/M2

## 2021-06-11 DIAGNOSIS — Z20.2 EXPOSURE TO CHLAMYDIA: Primary | ICD-10-CM

## 2021-06-11 DIAGNOSIS — T36.95XA ANTIBIOTIC-INDUCED YEAST INFECTION: ICD-10-CM

## 2021-06-11 DIAGNOSIS — B37.9 ANTIBIOTIC-INDUCED YEAST INFECTION: ICD-10-CM

## 2021-06-11 DIAGNOSIS — L73.9 FOLLICULITIS: ICD-10-CM

## 2021-06-11 PROCEDURE — 99214 OFFICE O/P EST MOD 30 MIN: CPT | Mod: 25 | Performed by: PHYSICIAN ASSISTANT

## 2021-06-11 PROCEDURE — 87591 N.GONORRHOEAE DNA AMP PROB: CPT

## 2021-06-11 PROCEDURE — 87491 CHLMYD TRACH DNA AMP PROBE: CPT

## 2021-06-11 RX ORDER — AZITHROMYCIN 500 MG/1
1000 TABLET, FILM COATED ORAL ONCE
Qty: 2 TABLET | Refills: 0 | Status: SHIPPED | OUTPATIENT
Start: 2021-06-11 | End: 2021-06-11

## 2021-06-11 RX ORDER — FLUCONAZOLE 150 MG/1
150 TABLET ORAL DAILY
Qty: 1 TABLET | Refills: 1 | Status: SHIPPED | OUTPATIENT
Start: 2021-06-11 | End: 2021-06-12

## 2021-06-12 DIAGNOSIS — Z20.2 EXPOSURE TO CHLAMYDIA: ICD-10-CM

## 2021-06-12 ASSESSMENT — ENCOUNTER SYMPTOMS
NAUSEA: 0
VOMITING: 0
FEVER: 0

## 2021-06-12 NOTE — PATIENT INSTRUCTIONS
Chlamydia, Female  Chlamydia is an STD (sexually transmitted disease). It is a bacterial infection that spreads (is contagious) through sexual contact. Chlamydia can occur in different areas of the body, including:  · The tube that moves urine from the bladder out of the body (urethra).  · The lower part of the uterus (cervix).  · The throat.  · The rectum.  This condition is not difficult to treat. However, if left untreated, chlamydia can lead to more serious health problems, including pelvic inflammatory disorder (PID). PID can increase your risk of not being able to have children (sterility). Also, if chlamydia is left untreated and you are pregnant or become pregnant, there is a chance that your baby can become infected during delivery. This may cause serious health problems for the baby.  What are the causes?  Chlamydia is caused by the bacteria Chlamydia trachomatis. It is passed from an infected partner during sexual activity. Chlamydia can spread through contact with the genitals, mouth, or rectum.  What are the signs or symptoms?  In some cases, there may not be any symptoms for this condition (asymptomatic), especially early in the infection. If symptoms develop, they may include:  · Burning with urination.  · Frequent urination.  · Vaginal discharge.  · Redness, soreness, and swelling (inflammation) of the rectum.  · Bleeding or discharge from the rectum.  · Abdominal pain.  · Pain during sexual intercourse.  · Bleeding between menstrual periods.  · Itching, burning, or redness in the eyes, or discharge from the eyes.  How is this diagnosed?  This condition may be diagnosed with:  · Urine tests.  · Swab tests. Depending on your symptoms, your health care provider may use a cotton swab to collect discharge from your vagina or rectum to test for the bacteria.  · A pelvic exam.  How is this treated?  This condition is treated with antibiotic medicines. If you are pregnant, certain types of antibiotics will  need to be avoided.  Follow these instructions at home:  Medicines  · Take over-the-counter and prescription medicines only as told by your health care provider.  · Take your antibiotic medicine as told by your health care provider. Do not stop taking the antibiotic even if you start to feel better.  Sexual activity  · Tell sexual partners about your infection. This includes any oral, anal, or vaginal sex partners you have had within 60 days of when your symptoms started. Sexual partners should also be treated, even if they have no signs of the disease.  · Do not have sex until you and your sexual partners have completed treatment and your health care provider says it is okay. If your health care provider prescribed you a single dose treatment, wait 7 days after taking the treatment before having sex.  General instructions  · It is your responsibility to get your test results. Ask your health care provider, or the department performing the test, when your results will be ready.  · Get plenty of rest.  · Eat a healthy, well-balanced diet.  · Drink enough fluids to keep your urine clear or pale yellow.  · Keep all follow-up visits as told by your health care provider. This is important. You may need to be tested for infection again 3 months after treatment.  How is this prevented?  The only sure way to prevent chlamydia is to avoid having sex. However, you can lower your risk by:  · Using latex condoms correctly every time you have sex.  · Not having multiple sexual partners.  · Asking if your sexual partner has been tested for STIs and had negative results.  Contact a health care provider if:  · You develop new symptoms or your symptoms do not get better after completing treatment.  · You have a fever or chills.  · You have pain during sexual intercourse.  Get help right away if:  · Your pain gets worse and does not get better with medicine.  · You develop flu-like symptoms, such as night sweats, sore throat, or  muscle aches.  · You experience nausea or vomiting.  · You have difficulty swallowing.  · You have bleeding between periods or after sex.  · You have irregular menstrual periods.  · You have abdominal or lower back pain that does not get better with medicine.  · You feel weak or dizzy, or you faint.  · You are pregnant and you develop symptoms of chlamydia.  Summary  · Chlamydia is an STD (sexually transmitted disease). It is a bacterial infection that spreads (is contagious) through sexual contact.  · This condition is not difficult to treat, however. If left untreated, chlamydia can lead to more serious health problems, including pelvic inflammatory disease (PID).  · In some cases, there may not be any symptoms for this condition (asymptomatic).  · This condition is treated with antibiotic medicines.  · Using latex condoms correctly every time you have sex can help prevent chlamydia.  This information is not intended to replace advice given to you by your health care provider. Make sure you discuss any questions you have with your health care provider.  Document Released: 09/27/2006 Document Revised: 06/11/2019 Document Reviewed: 12/04/2017  [employer] Patient Education © 2020 [employer]

## 2021-06-12 NOTE — PROGRESS NOTES
"Subjective:   Merissa Hyde is a 25 y.o. female who presents for Exposure to STD (patient was exposed to chlamydia. she was with her partner almost 2 weeks. he told her he was postive for chlamydia. she has a rash on her lower back. she is having esr itching and swelling. )        Exposure to STD  This is a new problem. Episode onset: 2 weeks  The problem has been unchanged. Associated symptoms include a rash (Red bumps on buttocks, acne like and itchy ). Pertinent negatives include no fever, nausea, urinary symptoms (No dysuria, frequency, urgency ) or vomiting.        She has a new male partner since May -- told today he tested positive for chlamydia     Hx of STD in past- HPV at age 17     Review of Systems   Constitutional: Negative for fever.   Gastrointestinal: Negative for nausea and vomiting.   Skin: Positive for rash (Red bumps on buttocks, acne like and itchy ).       PMH:  has a past medical history of Borderline personality disorder (HCC) and PTSD (post-traumatic stress disorder).  MEDS:   Current Outpatient Medications:   •  fluconazole (DIFLUCAN) 150 MG tablet, Take 1 tablet by mouth every day for 1 day., Disp: 1 tablet, Rfl: 1  ALLERGIES: No Known Allergies  SURGHX: History reviewed. No pertinent surgical history.  SOCHX:  reports that she has been smoking cigarettes. She has a 0.50 pack-year smoking history. She has never used smokeless tobacco. She reports current alcohol use. She reports current drug use. Drugs: Marijuana and Inhaled.  Family History   Problem Relation Age of Onset   • No Known Problems Father         Objective:   /80   Pulse 82   Temp 36.6 °C (97.9 °F) (Temporal)   Resp 18   Ht 1.549 m (5' 1\")   Wt 45.4 kg (100 lb)   SpO2 100%   BMI 18.89 kg/m²     Physical Exam  Vitals reviewed.   Constitutional:       General: She is not in acute distress.     Appearance: She is well-developed.   HENT:      Head: Normocephalic and atraumatic.   Neck:      Trachea: No " tracheal deviation.   Pulmonary:      Effort: Pulmonary effort is normal.   Skin:     General: Skin is warm and dry.      Capillary Refill: Capillary refill takes less than 2 seconds.          Neurological:      Mental Status: She is alert and oriented to person, place, and time.   Psychiatric:         Mood and Affect: Mood normal.         Behavior: Behavior normal.           Assessment/Plan:     1. Exposure to chlamydia    - Chlamydia/GC PCR Urine Or Swab; Future  - azithromycin (ZITHROMAX) 500 MG tablet; Take 2 Tablets by mouth one time for 1 dose.  Dispense: 2 tablet; Refill: 0  - cefTRIAXone (ROCEPHIN) 500 mg, lidocaine (XYLOCAINE) 1 % 1.8 mL for IM use    2. Antibiotic-induced yeast infection    - fluconazole (DIFLUCAN) 150 MG tablet; Take 1 tablet by mouth every day for 1 day.  Dispense: 1 tablet; Refill: 1    3. Folliculitis      Supportive care reviewed. GC/chlamydia culture obtained. She will be treated empirically with azithroymcin, rocephin. Avoid intercourse for 1 week. She will be notified of final culture results.     If symptoms worsen or persist patient can return to clinic for reevaluation. Side effects of medication discussed. Patient confirmed understanding of information.    My total time spent caring for the patient on the day of the encounter was 30 minutes.   This does not include time spent on separately billable procedures/tests.       Please note that this dictation was created using voice recognition software. I have made every reasonable attempt to correct obvious errors, but I expect that there are errors of grammar and possibly content that I did not discover before finalizing the note.

## 2021-06-14 ENCOUNTER — TELEPHONE (OUTPATIENT)
Dept: URGENT CARE | Facility: PHYSICIAN GROUP | Age: 26
End: 2021-06-14

## 2021-06-14 LAB
C TRACH DNA SPEC QL NAA+PROBE: POSITIVE
N GONORRHOEA DNA SPEC QL NAA+PROBE: NEGATIVE
SPECIMEN SOURCE: ABNORMAL

## 2021-08-22 ENCOUNTER — OFFICE VISIT (OUTPATIENT)
Dept: URGENT CARE | Facility: CLINIC | Age: 26
End: 2021-08-22

## 2021-08-22 ENCOUNTER — APPOINTMENT (OUTPATIENT)
Dept: RADIOLOGY | Facility: IMAGING CENTER | Age: 26
End: 2021-08-22
Attending: FAMILY MEDICINE
Payer: COMMERCIAL

## 2021-08-22 ENCOUNTER — HOSPITAL ENCOUNTER (OUTPATIENT)
Facility: MEDICAL CENTER | Age: 26
End: 2021-08-22
Attending: FAMILY MEDICINE
Payer: COMMERCIAL

## 2021-08-22 VITALS
BODY MASS INDEX: 19.63 KG/M2 | DIASTOLIC BLOOD PRESSURE: 70 MMHG | TEMPERATURE: 98.4 F | SYSTOLIC BLOOD PRESSURE: 104 MMHG | RESPIRATION RATE: 16 BRPM | OXYGEN SATURATION: 96 % | HEART RATE: 106 BPM | WEIGHT: 104 LBS | HEIGHT: 61 IN

## 2021-08-22 DIAGNOSIS — J45.901 REACTIVE AIRWAY DISEASE WITH ACUTE EXACERBATION, UNSPECIFIED ASTHMA SEVERITY, UNSPECIFIED WHETHER PERSISTENT: ICD-10-CM

## 2021-08-22 DIAGNOSIS — B34.9 VIRAL ILLNESS: ICD-10-CM

## 2021-08-22 DIAGNOSIS — J02.9 SORE THROAT: ICD-10-CM

## 2021-08-22 LAB
FLUAV+FLUBV AG SPEC QL IA: NEGATIVE
INT CON NEG: NEGATIVE
INT CON POS: POSITIVE

## 2021-08-22 PROCEDURE — 71046 X-RAY EXAM CHEST 2 VIEWS: CPT | Mod: TC | Performed by: FAMILY MEDICINE

## 2021-08-22 PROCEDURE — 87804 INFLUENZA ASSAY W/OPTIC: CPT | Performed by: FAMILY MEDICINE

## 2021-08-22 PROCEDURE — U0003 INFECTIOUS AGENT DETECTION BY NUCLEIC ACID (DNA OR RNA); SEVERE ACUTE RESPIRATORY SYNDROME CORONAVIRUS 2 (SARS-COV-2) (CORONAVIRUS DISEASE [COVID-19]), AMPLIFIED PROBE TECHNIQUE, MAKING USE OF HIGH THROUGHPUT TECHNOLOGIES AS DESCRIBED BY CMS-2020-01-R: HCPCS

## 2021-08-22 PROCEDURE — U0005 INFEC AGEN DETEC AMPLI PROBE: HCPCS

## 2021-08-22 PROCEDURE — 99214 OFFICE O/P EST MOD 30 MIN: CPT | Performed by: FAMILY MEDICINE

## 2021-08-22 RX ORDER — ALBUTEROL SULFATE 90 UG/1
2 AEROSOL, METERED RESPIRATORY (INHALATION) EVERY 4 HOURS PRN
Qty: 1 EACH | Refills: 0 | Status: SHIPPED | OUTPATIENT
Start: 2021-08-22

## 2021-08-22 RX ORDER — INHALER,ASSIST DEVICE,MED MASK
1 SPACER (EA) MISCELLANEOUS ONCE
OUTPATIENT
Start: 2021-08-22 | End: 2021-08-25

## 2021-08-22 RX ORDER — PREDNISONE 20 MG/1
TABLET ORAL
Qty: 10 TABLET | Refills: 0 | Status: SHIPPED | OUTPATIENT
Start: 2021-08-22

## 2021-08-22 NOTE — PATIENT INSTRUCTIONS
"Start oral steroid daily as directed  Use albuterol inhaler 2 puffs every 4 hours as needed with a spacer  Grisel pot at night recommended  Claritin-D daily might be tried for congestion  Over-the-counter medication as needed for cough  .  Stay quarantine until you get your Covid results back  Follow up if not significantly improved as expected, sooner if any worsening or new symptoms          Upper Respiratory Infection, Adult  An upper respiratory infection (URI) affects the nose, throat, and upper air passages. URIs are caused by germs (viruses). The most common type of URI is often called \"the common cold.\"  Medicines cannot cure URIs, but you can do things at home to relieve your symptoms. URIs usually get better within 7-10 days.  Follow these instructions at home:  Activity  · Rest as needed.  · If you have a fever, stay home from work or school until your fever is gone, or until your doctor says you may return to work or school.  ? You should stay home until you cannot spread the infection anymore (you are not contagious).  ? Your doctor may have you wear a face mask so you have less risk of spreading the infection.  Relieving symptoms  · Gargle with a salt-water mixture 3-4 times a day or as needed. To make a salt-water mixture, completely dissolve ½-1 tsp of salt in 1 cup of warm water.  · Use a cool-mist humidifier to add moisture to the air. This can help you breathe more easily.  Eating and drinking    · Drink enough fluid to keep your pee (urine) pale yellow.  · Eat soups and other clear broths.  General instructions    · Take over-the-counter and prescription medicines only as told by your doctor. These include cold medicines, fever reducers, and cough suppressants.  · Do not use any products that contain nicotine or tobacco. These include cigarettes and e-cigarettes. If you need help quitting, ask your doctor.  · Avoid being where people are smoking (avoid secondhand smoke).  · Make sure you get " "regular shots and get the flu shot every year.  · Keep all follow-up visits as told by your doctor. This is important.  How to avoid spreading infection to others    · Wash your hands often with soap and water. If you do not have soap and water, use hand .  · Avoid touching your mouth, face, eyes, or nose.  · Cough or sneeze into a tissue or your sleeve or elbow. Do not cough or sneeze into your hand or into the air.  Contact a doctor if:  · You are getting worse, not better.  · You have any of these:  ? A fever.  ? Chills.  ? Brown or red mucus in your nose.  ? Yellow or brown fluid (discharge)coming from your nose.  ? Pain in your face, especially when you bend forward.  ? Swollen neck glands.  ? Pain with swallowing.  ? White areas in the back of your throat.  Get help right away if:  · You have shortness of breath that gets worse.  · You have very bad or constant:  ? Headache.  ? Ear pain.  ? Pain in your forehead, behind your eyes, and over your cheekbones (sinus pain).  ? Chest pain.  · You have long-lasting (chronic) lung disease along with any of these:  ? Wheezing.  ? Long-lasting cough.  ? Coughing up blood.  ? A change in your usual mucus.  · You have a stiff neck.  · You have changes in your:  ? Vision.  ? Hearing.  ? Thinking.  ? Mood.  Summary  · An upper respiratory infection (URI) is caused by a germ called a virus. The most common type of URI is often called \"the common cold.\"  · URIs usually get better within 7-10 days.  · Take over-the-counter and prescription medicines only as told by your doctor.  This information is not intended to replace advice given to you by your health care provider. Make sure you discuss any questions you have with your health care provider.  Document Released: 06/05/2009 Document Revised: 12/26/2019 Document Reviewed: 08/10/2018  [employer]vier Patient Education © 2020 [employer].    "

## 2021-08-22 NOTE — LETTER
August 22, 2021         Patient: Merissa Hyde   YOB: 1995   Date of Visit: 8/22/2021           To Whom it May Concern:    Merissa Hyde was seen in my clinic on 8/22/2021. She should be excused until 08/25/2021.    If you have any questions or concerns, please don't hesitate to call.        Sincerely,           Corbin Nolan M.D.  Electronically Signed

## 2021-08-23 DIAGNOSIS — B34.9 VIRAL ILLNESS: ICD-10-CM

## 2025-05-16 ENCOUNTER — OFFICE VISIT (OUTPATIENT)
Dept: URGENT CARE | Facility: CLINIC | Age: 30
End: 2025-05-16

## 2025-05-16 ENCOUNTER — HOSPITAL ENCOUNTER (EMERGENCY)
Facility: MEDICAL CENTER | Age: 30
End: 2025-05-16
Attending: EMERGENCY MEDICINE

## 2025-05-16 ENCOUNTER — APPOINTMENT (OUTPATIENT)
Dept: RADIOLOGY | Facility: MEDICAL CENTER | Age: 30
End: 2025-05-16
Attending: EMERGENCY MEDICINE

## 2025-05-16 VITALS
BODY MASS INDEX: 27.43 KG/M2 | RESPIRATION RATE: 18 BRPM | DIASTOLIC BLOOD PRESSURE: 79 MMHG | OXYGEN SATURATION: 97 % | SYSTOLIC BLOOD PRESSURE: 130 MMHG | TEMPERATURE: 97 F | HEART RATE: 67 BPM | HEIGHT: 61 IN | WEIGHT: 145.28 LBS

## 2025-05-16 VITALS
HEIGHT: 61 IN | RESPIRATION RATE: 14 BRPM | DIASTOLIC BLOOD PRESSURE: 84 MMHG | TEMPERATURE: 96.8 F | OXYGEN SATURATION: 98 % | WEIGHT: 141 LBS | BODY MASS INDEX: 26.62 KG/M2 | HEART RATE: 66 BPM | SYSTOLIC BLOOD PRESSURE: 102 MMHG

## 2025-05-16 DIAGNOSIS — S09.8XXA BLUNT HEAD TRAUMA, INITIAL ENCOUNTER: ICD-10-CM

## 2025-05-16 DIAGNOSIS — S09.90XA INJURY OF HEAD, INITIAL ENCOUNTER: Primary | ICD-10-CM
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
## 2025-05-16 PROCEDURE — 70450 CT HEAD/BRAIN W/O DYE: CPT

## 2025-05-16 PROCEDURE — 99283 EMERGENCY DEPT VISIT LOW MDM: CPT

## 2025-05-16 PROCEDURE — 99999 PR NO CHARGE: CPT | Performed by: PHYSICIAN ASSISTANT

## 2025-05-16 ASSESSMENT — PAIN DESCRIPTION - PAIN TYPE: TYPE: ACUTE PAIN

## 2025-05-16 NOTE — PROGRESS NOTES
Patient with head injury and neurologic symptoms since.  Urgent care visit refunded and she is directed to the emergency department for more appropriate workup.  She has stable vitals and significant other will assist with transport the short distance to the emergency department.

## 2025-05-16 NOTE — ED PROVIDER NOTES
ER Provider Note    Scribed for Sage Rodriguez M.D. by Jesús Hinojosa. 5/16/2025   11:28 AM    Primary Care Provider: Gigi Nuñez P.A.-C.    CHIEF COMPLAINT  Chief Complaint   Patient presents with    T-5000 Head Injury     Pt fell & hit her head on 05/11, stood up off of patio chair & foot got caught on her chair  - fell forward with chair attached to her onto cement. Pt's boyfriend found her outside - probable LOC, pt does not recall the events after falling. No thinners.   Pt C/o shooting pain down her L side & visual changes to L eye intermittently. Sent from  for imaging. No other neurologic deficits.      EXTERNAL RECORDS REVIEWED  Outpatient Notes Patient was evaluated at urgent care prior to arrival today.      HPI/ROS  LIMITATION TO HISTORY   Select: : None  OUTSIDE HISTORIAN(S):  Significant other is present at bedside for support.     HELEN HERRMANN is a 29 y.o. female who presents to the ED for evaluation of a head injury onset five days ago. Patient reports that she was sitting in a chair five days ago, and when she stood up her leg got caught in the legs of her chair and she fell. She reports hitting her head on cement just above her left eye. Patient reports that she initially had a small hematoma with some redness, but has developed a much larger area of swelling and bruising around her eye. She did briefly lose consciousness, and reports having one episode of vomiting three days ago. Patient says she comes in today because her pain is not improving, and she describes pain in her posterior head that radiates down her back.     PAST MEDICAL HISTORY  Past Medical History[1]    SURGICAL HISTORY  Past Surgical History[2]    FAMILY HISTORY  Family History   Problem Relation Age of Onset    No Known Problems Father        SOCIAL HISTORY   reports that she has quit smoking. Her smoking use included cigarettes. She has a 0.5 pack-year smoking history. She has never used smokeless tobacco.  "She reports current alcohol use. She reports that she does not currently use drugs after having used the following drugs: Marijuana and Inhaled.    CURRENT MEDICATIONS  Previous Medications    ALBUTEROL 108 (90 BASE) MCG/ACT AERO SOLN INHALATION AEROSOL    Inhale 2 Puffs every four hours as needed (wheezing).    PREDNISONE (DELTASONE) 20 MG TAB    Take 40mg daily x 5 days       ALLERGIES  Allergies[3]     PHYSICAL EXAM  BP (!) 148/94   Pulse 68   Temp 35.9 °C (96.6 °F) (Temporal)   Resp 18   Ht 1.549 m (5' 1\")   Wt 65.9 kg (145 lb 4.5 oz)   LMP 04/30/2025   SpO2 98%   BMI 27.45 kg/m²    Nursing note and vitals reviewed.  Constitutional: Well-developed and well-nourished. No distress.   HENT: Head is normocephalic with Bruising and swelling to left orbital ridge with left johnie orbital ecchymosis. Oropharynx is clear and moist without exudate or erythema.   Neck: No C spine tenderness to palpation.   Eyes: Pupils are equal, round, and reactive to light. Conjunctiva are normal.   Cardiovascular: Normal rate and regular rhythm. No murmur heard. Normal radial pulses.  Pulmonary/Chest: Breath sounds normal. No wheezes or rales.   Abdominal: Soft and non-tender. No distention    Musculoskeletal: Extremities exhibit normal range of motion without edema or tenderness.   Neurological: Awake, alert and oriented to person, place, and time. No focal deficits noted.  Skin: Skin is warm and dry. No rash.   Psychiatric: Normal mood and affect. Appropriate for clinical situation    DIAGNOSTIC STUDIES    Radiology:   This attending emergency physician has independently interpreted the diagnostic imaging associated with this visit and is awaiting the final reading from the radiologist.   Preliminary interpretation is a follows: Pending    Radiologist interpretation:   CT-HEAD W/O    (Results Pending)        ASSESSMENT AND PLAN    11:28 AM - Patient was evaluated at bedside. Patient arrives today as advised by urgent care for " evaluation of a head injury after a ground level fall five days ago. Ordered for CT-Head without to evaluate. Patient verbalizes understanding and support with my plan of care.  Patient will undergo evaluation for traumatic injury.     1:02 PM - I reevaluated the patient at bedside.  I discussed the patient's diagnostic study results which show no acute traumatic injuries. I discussed plan for discharge and follow up as outlined below. The patient is stable for discharge at this time and will return for any new or worsening symptoms. Patient verbalizes understanding and support with my plan for discharge.        2:33 PM the patient continues to wait for head CT.  Has been over 3 hours.  At this time Dr. Gee will assume care.  Final disposition based on CT scan results.  FINAL DIAGNOSIS  Blunt head trauma  Fall     IJesús (Scribe), am scribing for, and in the presence of, Sage Rodriguez M.D..    Electronically signed by: Jesús Hinojosa (Scribe), 5/16/2025    ISage M.D. personally performed the services described in this documentation, as scribed by Jesús Hinojosa in my presence, and it is both accurate and complete.      The note accurately reflects work and decisions made by me.  Sage Rodriguez M.D.  5/16/2025  2:34 PM          [1]   Past Medical History:  Diagnosis Date    Borderline personality disorder (HCC)     PTSD (post-traumatic stress disorder)    [2] No past surgical history on file.  [3] No Known Allergies

## 2025-05-16 NOTE — ED NOTES
Discharge education provided by ERP. Discharge paperwork reviewed with patient. All questions answered. All belongings with patient. Patient ambulated accompanied by spouse to lobby unassisted with steady gait with all belongings in her possession.

## 2025-05-16 NOTE — ED TRIAGE NOTES
HELEN HERRMANN  29 y.o.  female  Chief Complaint   Patient presents with    T-5000 Head Injury     Pt fell & hit her head on 05/11, stood up off of patio chair & foot got caught on her chair  - fell forward with chair attached to her onto cement. Pt's boyfriend found her outside - probable LOC, pt does not recall the events after falling. No thinners.   Pt C/o shooting pain down her L side & visual changes to L eye intermittently. Sent from  for imaging. No other neurologic deficits.      Gait steady in triage, has a healing black eye to L side. Denies any DV.   Patient educated on triage process, to alert staff if any changes in condition.